# Patient Record
Sex: FEMALE | Race: WHITE | Employment: OTHER | RURAL
[De-identification: names, ages, dates, MRNs, and addresses within clinical notes are randomized per-mention and may not be internally consistent; named-entity substitution may affect disease eponyms.]

---

## 2017-01-01 ENCOUNTER — HOSPITAL ENCOUNTER (OUTPATIENT)
Dept: OTHER | Age: 67
Discharge: HOME | End: 2017-10-18
Attending: NURSE PRACTITIONER | Admitting: NURSE PRACTITIONER

## 2017-01-01 ENCOUNTER — OFFICE VISIT (OUTPATIENT)
Dept: FAMILY MEDICINE CLINIC | Age: 67
End: 2017-01-01
Payer: MEDICARE

## 2017-01-01 VITALS
DIASTOLIC BLOOD PRESSURE: 80 MMHG | HEART RATE: 86 BPM | RESPIRATION RATE: 18 BRPM | BODY MASS INDEX: 32.61 KG/M2 | HEIGHT: 64 IN | OXYGEN SATURATION: 97 % | SYSTOLIC BLOOD PRESSURE: 162 MMHG | WEIGHT: 191 LBS

## 2017-01-01 DIAGNOSIS — R60.9 EDEMA, UNSPECIFIED TYPE: ICD-10-CM

## 2017-01-01 DIAGNOSIS — E78.5 HYPERLIPIDEMIA, UNSPECIFIED HYPERLIPIDEMIA TYPE: ICD-10-CM

## 2017-01-01 DIAGNOSIS — N18.4 KIDNEY DISEASE, CHRONIC, STAGE IV (GFR 15-29 ML/MIN) (HCC): ICD-10-CM

## 2017-01-01 DIAGNOSIS — H54.7 LOSS OF VISION: ICD-10-CM

## 2017-01-01 DIAGNOSIS — R53.83 FATIGUE, UNSPECIFIED TYPE: ICD-10-CM

## 2017-01-01 DIAGNOSIS — J81.0 ACUTE PULMONARY EDEMA (HCC): ICD-10-CM

## 2017-01-01 DIAGNOSIS — L65.9 ALOPECIA: ICD-10-CM

## 2017-01-01 DIAGNOSIS — R05.8 COUGH WITH FROTHY SPUTUM: ICD-10-CM

## 2017-01-01 DIAGNOSIS — R06.02 SHORTNESS OF BREATH: ICD-10-CM

## 2017-01-01 DIAGNOSIS — J81.0 ACUTE PULMONARY EDEMA (HCC): Primary | ICD-10-CM

## 2017-01-01 DIAGNOSIS — I50.9 CONGESTIVE HEART FAILURE, UNSPECIFIED CONGESTIVE HEART FAILURE CHRONICITY, UNSPECIFIED CONGESTIVE HEART FAILURE TYPE: ICD-10-CM

## 2017-01-01 DIAGNOSIS — N18.4 STAGE 4 CHRONIC KIDNEY DISEASE (HCC): ICD-10-CM

## 2017-01-01 DIAGNOSIS — R21 SKIN RASH: ICD-10-CM

## 2017-01-01 DIAGNOSIS — R73.9 HYPERGLYCEMIA: ICD-10-CM

## 2017-01-01 DIAGNOSIS — R09.3 COUGH WITH FROTHY SPUTUM: ICD-10-CM

## 2017-01-01 DIAGNOSIS — Z82.49 FAMILY HISTORY OF CARDIOVASCULAR DISEASE: ICD-10-CM

## 2017-01-01 DIAGNOSIS — R93.89 ABNORMAL CHEST X-RAY: ICD-10-CM

## 2017-01-01 DIAGNOSIS — Z86.79 HISTORY OF CONGESTIVE HEART FAILURE: ICD-10-CM

## 2017-01-01 DIAGNOSIS — I10 ESSENTIAL HYPERTENSION: ICD-10-CM

## 2017-01-01 DIAGNOSIS — Z13.29 THYROID DISORDER SCREENING: ICD-10-CM

## 2017-01-01 DIAGNOSIS — I20.8 STABLE ANGINA (HCC): ICD-10-CM

## 2017-01-01 DIAGNOSIS — B39.9 HISTOPLASMOSIS: ICD-10-CM

## 2017-01-01 DIAGNOSIS — Z87.09 HISTORY OF PULMONARY EDEMA: ICD-10-CM

## 2017-01-01 DIAGNOSIS — Z91.81 HISTORY OF RECENT FALL: ICD-10-CM

## 2017-01-01 DIAGNOSIS — H26.9 CATARACT OF RIGHT EYE, UNSPECIFIED CATARACT TYPE: ICD-10-CM

## 2017-01-01 DIAGNOSIS — R91.8 MASS OF RIGHT LUNG: Primary | ICD-10-CM

## 2017-01-01 LAB
A/G RATIO: 1.4 (ref 0.8–2)
ALBUMIN SERPL-MCNC: 3.9 G/DL (ref 3.4–4.8)
ALP BLD-CCNC: 103 U/L (ref 25–100)
ALT SERPL-CCNC: 6 U/L (ref 4–36)
ANION GAP SERPL CALCULATED.3IONS-SCNC: 16 MMOL/L (ref 3–16)
AST SERPL-CCNC: 13 U/L (ref 8–33)
BASOPHILS ABSOLUTE: 0.1 K/UL (ref 0–0.1)
BASOPHILS RELATIVE PERCENT: 0.4 %
BILIRUB SERPL-MCNC: 0.3 MG/DL (ref 0.3–1.2)
BUN BLDV-MCNC: 29 MG/DL (ref 6–20)
CALCIUM SERPL-MCNC: 8.8 MG/DL (ref 8.5–10.5)
CHLORIDE BLD-SCNC: 108 MMOL/L (ref 98–107)
CHOLESTEROL, TOTAL: 81 MG/DL (ref 0–200)
CO2: 18 MMOL/L (ref 20–30)
CREAT SERPL-MCNC: 2.2 MG/DL (ref 0.4–1.2)
EOSINOPHILS ABSOLUTE: 0.1 K/UL (ref 0–0.4)
EOSINOPHILS RELATIVE PERCENT: 0.8 %
GFR AFRICAN AMERICAN: 27
GFR NON-AFRICAN AMERICAN: 22
GLOBULIN: 2.7 G/DL
GLUCOSE BLD-MCNC: 143 MG/DL (ref 74–106)
HBA1C MFR BLD: 5.2 %
HCT VFR BLD CALC: 28.9 % (ref 37–47)
HDLC SERPL-MCNC: 33 MG/DL (ref 40–60)
HEMOGLOBIN: 8.7 G/DL (ref 11.5–16.5)
LDL CHOLESTEROL CALCULATED: 33 MG/DL
LYMPHOCYTES ABSOLUTE: 0.9 K/UL (ref 1.5–4)
LYMPHOCYTES RELATIVE PERCENT: 7.7 % (ref 20–40)
MCH RBC QN AUTO: 24.1 PG (ref 27–32)
MCHC RBC AUTO-ENTMCNC: 30.1 G/DL (ref 31–35)
MCV RBC AUTO: 80.1 FL (ref 80–100)
MONOCYTES ABSOLUTE: 0.8 K/UL (ref 0.2–0.8)
MONOCYTES RELATIVE PERCENT: 6.7 % (ref 3–10)
NEUTROPHILS ABSOLUTE: 9.6 K/UL (ref 2–7.5)
NEUTROPHILS RELATIVE PERCENT: 84.4 %
PDW BLD-RTO: 15.5 % (ref 11–16)
PLATELET # BLD: 415 K/UL (ref 150–400)
PMV BLD AUTO: 8.8 FL (ref 6–10)
POTASSIUM SERPL-SCNC: 4.7 MMOL/L (ref 3.4–5.1)
RBC # BLD: 3.61 M/UL (ref 3.8–5.8)
SODIUM BLD-SCNC: 142 MMOL/L (ref 136–145)
T4 FREE: 1.45 NG/DL (ref 0.89–1.76)
TOTAL PROTEIN: 6.6 G/DL (ref 6.4–8.3)
TRIGL SERPL-MCNC: 77 MG/DL (ref 0–249)
TSH REFLEX: 2.77 UIU/ML (ref 0.35–5.5)
VITAMIN B-12: 259 PG/ML (ref 211–911)
VLDLC SERPL CALC-MCNC: 15 MG/DL
WBC # BLD: 11.4 K/UL (ref 4–11)

## 2017-01-01 PROCEDURE — G8417 CALC BMI ABV UP PARAM F/U: HCPCS | Performed by: NURSE PRACTITIONER

## 2017-01-01 PROCEDURE — 99204 OFFICE O/P NEW MOD 45 MIN: CPT | Performed by: NURSE PRACTITIONER

## 2017-01-01 PROCEDURE — 1036F TOBACCO NON-USER: CPT | Performed by: NURSE PRACTITIONER

## 2017-01-01 PROCEDURE — 3017F COLORECTAL CA SCREEN DOC REV: CPT | Performed by: NURSE PRACTITIONER

## 2017-01-01 PROCEDURE — G8599 NO ASA/ANTIPLAT THER USE RNG: HCPCS | Performed by: NURSE PRACTITIONER

## 2017-01-01 PROCEDURE — 1123F ACP DISCUSS/DSCN MKR DOCD: CPT | Performed by: NURSE PRACTITIONER

## 2017-01-01 PROCEDURE — 3014F SCREEN MAMMO DOC REV: CPT | Performed by: NURSE PRACTITIONER

## 2017-01-01 PROCEDURE — G8484 FLU IMMUNIZE NO ADMIN: HCPCS | Performed by: NURSE PRACTITIONER

## 2017-01-01 PROCEDURE — 4040F PNEUMOC VAC/ADMIN/RCVD: CPT | Performed by: NURSE PRACTITIONER

## 2017-01-01 PROCEDURE — 1090F PRES/ABSN URINE INCON ASSESS: CPT | Performed by: NURSE PRACTITIONER

## 2017-01-01 PROCEDURE — G8400 PT W/DXA NO RESULTS DOC: HCPCS | Performed by: NURSE PRACTITIONER

## 2017-01-01 PROCEDURE — G8427 DOCREV CUR MEDS BY ELIG CLIN: HCPCS | Performed by: NURSE PRACTITIONER

## 2017-01-01 RX ORDER — DOXAZOSIN 2 MG/1
2 TABLET ORAL DAILY
COMMUNITY
Start: 2017-10-09 | End: 2017-01-01 | Stop reason: SDUPTHER

## 2017-01-01 RX ORDER — NITROGLYCERIN 0.4 MG/1
0.4 TABLET SUBLINGUAL EVERY 5 MIN PRN
COMMUNITY

## 2017-01-01 RX ORDER — LABETALOL 300 MG/1
300 TABLET, FILM COATED ORAL DAILY
Qty: 30 TABLET | Refills: 0
Start: 2017-01-01 | End: 2018-01-01 | Stop reason: SDUPTHER

## 2017-01-01 RX ORDER — TRIAMCINOLONE ACETONIDE 1 MG/G
CREAM TOPICAL
Qty: 80 G | Refills: 3 | Status: SHIPPED | OUTPATIENT
Start: 2017-01-01 | End: 2018-01-01 | Stop reason: ALTCHOICE

## 2017-01-01 RX ORDER — LEVOFLOXACIN 500 MG/1
500 TABLET, FILM COATED ORAL DAILY
Qty: 10 TABLET | Refills: 0 | Status: SHIPPED | OUTPATIENT
Start: 2017-01-01 | End: 2017-01-01

## 2017-01-01 RX ORDER — LABETALOL 300 MG/1
300 TABLET, FILM COATED ORAL 3 TIMES DAILY
COMMUNITY
Start: 2017-09-05 | End: 2017-01-01 | Stop reason: SDUPTHER

## 2017-01-01 RX ORDER — CHLORTHALIDONE 25 MG/1
25 TABLET ORAL DAILY
COMMUNITY
Start: 2017-10-02 | End: 2018-01-01 | Stop reason: ALTCHOICE

## 2017-01-01 RX ORDER — NIFEDIPINE 90 MG/1
90 TABLET, EXTENDED RELEASE ORAL DAILY
Qty: 30 TABLET | Refills: 0
Start: 2017-01-01

## 2017-01-01 RX ORDER — SIMVASTATIN 40 MG
40 TABLET ORAL DAILY
COMMUNITY
Start: 2017-08-18

## 2017-01-01 RX ORDER — LISINOPRIL 20 MG/1
TABLET ORAL
COMMUNITY
Start: 2017-08-22 | End: 2018-01-01 | Stop reason: ALTCHOICE

## 2017-01-01 RX ORDER — DOXAZOSIN 2 MG/1
4 TABLET ORAL DAILY
Qty: 30 TABLET | Refills: 0
Start: 2017-01-01 | End: 2018-01-01 | Stop reason: ALTCHOICE

## 2017-01-01 RX ORDER — NIFEDIPINE 90 MG/1
90 TABLET, EXTENDED RELEASE ORAL 3 TIMES DAILY
COMMUNITY
Start: 2017-09-05 | End: 2017-01-01 | Stop reason: SDUPTHER

## 2017-01-01 ASSESSMENT — ENCOUNTER SYMPTOMS
SHORTNESS OF BREATH: 1
CHEST TIGHTNESS: 1
COUGH: 1
BACK PAIN: 1

## 2017-01-01 ASSESSMENT — PATIENT HEALTH QUESTIONNAIRE - PHQ9
1. LITTLE INTEREST OR PLEASURE IN DOING THINGS: 0
2. FEELING DOWN, DEPRESSED OR HOPELESS: 0
SUM OF ALL RESPONSES TO PHQ9 QUESTIONS 1 & 2: 0
SUM OF ALL RESPONSES TO PHQ QUESTIONS 1-9: 0

## 2017-02-01 RX ORDER — SIMVASTATIN 40 MG
TABLET ORAL
Qty: 90 TABLET | Refills: 1 | Status: SHIPPED | OUTPATIENT
Start: 2017-02-01 | End: 2017-08-18 | Stop reason: SDUPTHER

## 2017-02-09 RX ORDER — DOXAZOSIN 2 MG/1
TABLET ORAL
Qty: 90 TABLET | Refills: 3 | Status: SHIPPED | OUTPATIENT
Start: 2017-02-09 | End: 2017-10-09 | Stop reason: SDUPTHER

## 2017-03-10 RX ORDER — LABETALOL 300 MG/1
TABLET, FILM COATED ORAL
Qty: 90 TABLET | Refills: 3 | Status: SHIPPED | OUTPATIENT
Start: 2017-03-10 | End: 2017-12-18

## 2017-03-23 ENCOUNTER — OFFICE VISIT (OUTPATIENT)
Dept: CARDIOLOGY | Facility: CLINIC | Age: 67
End: 2017-03-23

## 2017-03-23 VITALS
WEIGHT: 188.9 LBS | BODY MASS INDEX: 32.25 KG/M2 | DIASTOLIC BLOOD PRESSURE: 86 MMHG | HEART RATE: 88 BPM | SYSTOLIC BLOOD PRESSURE: 160 MMHG | HEIGHT: 64 IN

## 2017-03-23 DIAGNOSIS — I25.10 CORONARY ARTERY DISEASE INVOLVING NATIVE CORONARY ARTERY OF NATIVE HEART WITHOUT ANGINA PECTORIS: Primary | ICD-10-CM

## 2017-03-23 DIAGNOSIS — I70.1 ATHEROSCLEROTIC RENAL ARTERY STENOSIS, BILATERAL (HCC): ICD-10-CM

## 2017-03-23 DIAGNOSIS — I73.9 PAD (PERIPHERAL ARTERY DISEASE) (HCC): ICD-10-CM

## 2017-03-23 DIAGNOSIS — R09.89 BRUIT OF LEFT CAROTID ARTERY: ICD-10-CM

## 2017-03-23 DIAGNOSIS — I15.0 RENOVASCULAR HYPERTENSION: ICD-10-CM

## 2017-03-23 PROCEDURE — 99214 OFFICE O/P EST MOD 30 MIN: CPT | Performed by: INTERNAL MEDICINE

## 2017-03-23 RX ORDER — CHLORTHALIDONE 25 MG/1
12.5 TABLET ORAL DAILY
Qty: 15 TABLET | Refills: 11 | Status: SHIPPED | OUTPATIENT
Start: 2017-03-23 | End: 2017-10-09

## 2017-03-23 RX ORDER — LISINOPRIL 20 MG/1
20 TABLET ORAL 2 TIMES DAILY
COMMUNITY
End: 2017-05-22 | Stop reason: SDUPTHER

## 2017-03-23 RX ORDER — NITROGLYCERIN 0.4 MG/1
0.4 TABLET SUBLINGUAL
COMMUNITY

## 2017-03-23 NOTE — PROGRESS NOTES
Subjective:     Encounter Date:03/23/2017      Patient ID: Johnathan Chaves is a 66 y.o. female.  PROBLEM LIST:  1. Coronary artery disease with nonobstructive plaque and less than 50% stenoses by catheterization, 06/25/2007.  2. Hypertension:  a. Bilateral renal artery stenoses, status post stenting, 06/25/2007.  b. Well controlled hypertension on medical therapy.  c. Recent renal artery duplex, 03/23/2010, revealing bilateral renal artery stenosis greater than 60% on the right and less than 60% on the left.  3. Peripheral arterial disease:  a. Status post right common iliac stenting, Dr. Leeroy Tony, 06/25/2007.  b. Abnormal ankle brachial indices, 0.64 on the right and 0.92 on the left, 03/23/2010.  4. Dyspepsia.  5. Dyslipidemia (followed by primary care provider).     ALLERGIES/DRUG INTOLERANCES: NKDA.  Chief Complaint: Hypertension  HPI  This is a 66-year-old white female patient with known renal artery stenosis, nonobstructive coronary disease, and peripheral arterial occlusive disease who reports for routine follow-up to evaluate high blood pressure.  The patient is currently taking for blood pressure medications.  Her last to clinic blood pressures have been 160/85.  The patient reports that her home blood pressures tend to run around 130 to 135 mmHg systolic and 80-90 mmHg diastolic.  She has no chest pain or shortness of breath.  She reports having some swelling in her lower extremities particularly on the left lower extremity at the end of the day.  This is worse if she's been on her feet for prolonged periods of time.  She has no orthopnea or PND.  She has no dizziness palpitations or syncope.  She reports stable intermittent claudication.  Ankle-brachial indices 7 years ago demonstrated a right ankle-brachial index of 0.64.  She has previously had stenting of her right common iliac artery.  She has no resting pain or tissue loss.  She is a nondiabetic.  She stopped smoking in 2007.  She has no  history of stroke or TIA.  She has no focal numbness weakness or paralysis, difficulty with gait or balance, difficulty with swallowing or chewing and no difficulty with speech or understanding.  There have been no visual changes.  The remainder of her past medical history, family history and social history remains unchanged compared to her visit one year ago.  The following portions of the patient's history were reviewed and updated as appropriate: allergies, current medications, past family history, past medical history, past social history, past surgical history and problem  Review of Systems   Constitution: Negative for chills, diaphoresis, fever, weakness, malaise/fatigue, night sweats, weight gain and weight loss.   HENT: Negative for ear discharge, hearing loss, hoarse voice and nosebleeds.    Eyes: Negative for discharge, double vision, pain and photophobia.   Cardiovascular: Positive for claudication and leg swelling. Negative for chest pain, cyanosis, dyspnea on exertion, irregular heartbeat, near-syncope, orthopnea, palpitations, paroxysmal nocturnal dyspnea and syncope.   Respiratory: Negative for cough, hemoptysis, shortness of breath, sputum production and wheezing.    Endocrine: Negative for cold intolerance, heat intolerance, polydipsia, polyphagia and polyuria.   Hematologic/Lymphatic: Negative for adenopathy and bleeding problem. Does not bruise/bleed easily.   Skin: Negative for color change, flushing, itching and rash.   Musculoskeletal: Negative for muscle cramps, muscle weakness, myalgias and stiffness.   Gastrointestinal: Negative for abdominal pain, diarrhea, hematemesis, hematochezia, nausea and vomiting.   Genitourinary: Negative for dysuria, frequency and nocturia.   Neurological: Negative for focal weakness, loss of balance, numbness, paresthesias and seizures.   Psychiatric/Behavioral: Negative for altered mental status, hallucinations and suicidal ideas.   Allergic/Immunologic: Negative  for HIV exposure, hives and persistent infections.       Current Outpatient Prescriptions:   •  doxazosin (CARDURA) 2 MG tablet, TAKE ONE TABLET BY MOUTH ONCE NIGHTLY, Disp: 90 tablet, Rfl: 3  •  labetalol (NORMODYNE) 300 MG tablet, TAKE ONE TABLET BY MOUTH ONCE DAILY, Disp: 90 tablet, Rfl: 3  •  lisinopril (PRINIVIL,ZESTRIL) 20 MG tablet, Take 20 mg by mouth 2 (Two) Times a Day., Disp: , Rfl:   •  NIFEdipine XL (PROCARDIA XL) 90 MG 24 hr tablet, TAKE ONE TABLET BY MOUTH ONCE DAILY, Disp: 90 tablet, Rfl: 1  •  nitroglycerin (NITROSTAT) 0.4 MG SL tablet, Place 0.4 mg under the tongue Every 5 (Five) Minutes As Needed for Chest Pain. Take no more than 3 doses in 15 minutes., Disp: , Rfl:   •  simvastatin (ZOCOR) 40 MG tablet, TAKE ONE TABLET BY MOUTH ONCE NIGHTLY, Disp: 90 tablet, Rfl: 1  •  chlorthalidone (HYGROTON) 25 MG tablet, Take 0.5 tablets by mouth Daily., Disp: 15 tablet, Rfl: 11     Objective:     Physical Exam   Constitutional: She is oriented to person, place, and time. She appears well-developed and well-nourished.   HENT:   Head: Normocephalic and atraumatic.   Eyes: Conjunctivae are normal. No scleral icterus.   Cardiovascular: Normal rate, regular rhythm and intact distal pulses.  PMI is not displaced.  Exam reveals gallop and S4. Exam reveals no friction rub.    No murmur heard.  Pulses:       Carotid pulses are 2+ on the right side, and 2+ on the left side with bruit.       Radial pulses are 2+ on the right side, and 2+ on the left side.        Dorsalis pedis pulses are 0 on the right side, and 0 on the left side.        Posterior tibial pulses are 0 on the right side, and 0 on the left side.   Pulmonary/Chest: Effort normal and breath sounds normal. No respiratory distress.   Abdominal: Soft. Bowel sounds are normal. There is no tenderness.   Musculoskeletal: She exhibits no edema.   Neurological: She is alert and oriented to person, place, and time.   Skin: Skin is warm and dry.   Psychiatric: She  "has a normal mood and affect. Her behavior is normal.     Blood pressure 160/86, pulse 88, height 64\" (162.6 cm), weight 188 lb 14.4 oz (85.7 kg).   Lab Review:       Assessment:         1. Coronary artery disease involving native coronary artery of native heart without angina pectoris  Stable and angina free.  She has no exertional chest arm neck jaw shoulder or back discomfort.  There is no evidence of active ischemic heart disease, congestive heart failure or arrhythmia.    2. PAD (peripheral artery disease)  The patient has relatively stable intermittent claudication.  She reports remaining active but does not participate in a formal walking program.  There is no evidence of critical limb ischemia.  - US Ankle / Brachial Indices Extremity Complete    3. Renovascular hypertension  Her blood pressure over the last 2 cardiology clinic appointments has been higher than acceptable.  She reports having systolic blood pressures at home consistently less than 140 mmHg.  She is known to have previous bilateral renal artery PTA and stenting.  In 2010 she had a renal artery duplex Doppler exam which suggested bilateral renal artery stenosis of more than 60% severity.    4. Atherosclerotic renal artery stenosis, bilateral      5. Bruit of left carotid artery  The patient has a loud left-sided carotid bruit.  She indicates that she had a carotid artery evaluation at the [facility] more than 10 years ago does not recall the results.  She currently has no symptoms of stroke or TIA.    - Duplex Carotid Ultrasound CAR    Procedures     Plan:       I have renewed her sublingual nitroglycerin.  The patient has not required any sublingual nitroglycerin use in the last 2 years.  I have added low-dose chlorthalidone 12.5 mg once in the morning to her blood pressure medications.  If her blood pressure remains consistently elevated I would increase the frequency of her labetalol.  She is currently taking 300 " mg once per day.  She would be better served taking a lower dose such as one to 200 mg 2-3 times per day.  She has been counseled regarding the importance of dietary sodium restriction.  She has been congratulated on her smoking cessation and cautioned to never again resumed cigarette smoking.  The patient is not yet in a clinical scenario that would warrant repeat renal artery revascularization.  I have recommended repeat ankle-brachial indices.  I have also recommended bilateral carotid artery ultrasound with duplex Doppler.  Further recommendations will be predicated on the outcome of this testing.

## 2017-03-28 ENCOUNTER — HOSPITAL ENCOUNTER (OUTPATIENT)
Dept: GENERAL RADIOLOGY | Age: 67
Discharge: HOME | End: 2017-03-28
Attending: INTERNAL MEDICINE | Admitting: INTERNAL MEDICINE

## 2017-03-28 DIAGNOSIS — I73.9 PAD (PERIPHERAL ARTERY DISEASE) (HCC): ICD-10-CM

## 2017-03-28 DIAGNOSIS — I65.01 OCCLUSION AND STENOSIS OF RIGHT VERTEBRAL ARTERY: ICD-10-CM

## 2017-04-27 ENCOUNTER — OFFICE VISIT (OUTPATIENT)
Dept: CARDIOLOGY | Facility: CLINIC | Age: 67
End: 2017-04-27

## 2017-04-27 VITALS
BODY MASS INDEX: 32.96 KG/M2 | WEIGHT: 186 LBS | SYSTOLIC BLOOD PRESSURE: 160 MMHG | DIASTOLIC BLOOD PRESSURE: 70 MMHG | HEIGHT: 63 IN | HEART RATE: 84 BPM

## 2017-04-27 DIAGNOSIS — I73.9 PAD (PERIPHERAL ARTERY DISEASE) (HCC): ICD-10-CM

## 2017-04-27 DIAGNOSIS — R09.89 BRUIT OF LEFT CAROTID ARTERY: ICD-10-CM

## 2017-04-27 DIAGNOSIS — I15.0 RENOVASCULAR HYPERTENSION: ICD-10-CM

## 2017-04-27 DIAGNOSIS — I25.10 CORONARY ARTERY DISEASE INVOLVING NATIVE CORONARY ARTERY OF NATIVE HEART WITHOUT ANGINA PECTORIS: Primary | ICD-10-CM

## 2017-04-27 PROCEDURE — 99213 OFFICE O/P EST LOW 20 MIN: CPT | Performed by: INTERNAL MEDICINE

## 2017-04-27 NOTE — PROGRESS NOTES
Subjective:     Encounter Date:04/27/2017      Patient ID: Johnathan Chaves is a 66 y.o. female.    Chief Complaint:Hypertension  HPI  This is a 66-year-old female patient who has known renovascular hypertension and has had previous bilateral renal artery PTA and stenting in June 2007 who was evaluated at last visit for PAD.  The patient underwent resting ankle brachial indices which demonstrated an abnormal value on the right of 0.4 and an abnormal value on the left of 0.8.  The patient indicates that she has no claudication.  She indicates that she walks substantial distances daily and has no leg pain weakness or other atypical lower extremity symptoms.  She has had no resting pain or ulcerations.  She is no longer a smoker.  The patient was recently started on diuretic therapy and has lost 10 pounds of weight.  She indicates that her shortness of breath has improved dramatically.  The patient indicates that she can she walked from the parking lot to to the clinic including coming up the hill to the emergency room she had no shortness of breath, chest discomfort, or leg pain\weakness.  She has no orthopnea PND or lower extremity edema.  There is no dizziness palpitations or syncope.  The patient also underwent carotid artery ultrasound with duplex Doppler which showed no evidence of obstructive disease.  She has no symptoms of stroke or TIA.  The remainder of her past medical history, family history and social history remains unchanged compared to her last visit.  The following portions of the patient's history were reviewed and updated as appropriate: allergies, current medications, past family history, past medical history, past social history, past surgical history and problem  Review of Systems   Constitution: Negative for chills, diaphoresis, fever, weakness, malaise/fatigue, night sweats, weight gain and weight loss.   HENT: Negative for ear discharge, hearing loss, hoarse voice and nosebleeds.    Eyes:  Negative for discharge, double vision, pain and photophobia.   Cardiovascular: Negative for chest pain, claudication, cyanosis, dyspnea on exertion, irregular heartbeat, leg swelling, near-syncope, orthopnea, palpitations, paroxysmal nocturnal dyspnea and syncope.   Respiratory: Negative for cough, hemoptysis, shortness of breath, sputum production and wheezing.    Endocrine: Negative for cold intolerance, heat intolerance, polydipsia, polyphagia and polyuria.   Hematologic/Lymphatic: Negative for adenopathy and bleeding problem. Does not bruise/bleed easily.   Skin: Negative for color change, flushing, itching and rash.   Musculoskeletal: Negative for muscle cramps, muscle weakness, myalgias and stiffness.   Gastrointestinal: Negative for abdominal pain, diarrhea, hematemesis, hematochezia, nausea and vomiting.   Genitourinary: Negative for dysuria, frequency and nocturia.   Neurological: Negative for focal weakness, loss of balance, numbness, paresthesias and seizures.   Psychiatric/Behavioral: Negative for altered mental status, hallucinations and suicidal ideas.   Allergic/Immunologic: Negative for HIV exposure, hives and persistent infections.         Current Outpatient Prescriptions:   •  chlorthalidone (HYGROTON) 25 MG tablet, Take 0.5 tablets by mouth Daily., Disp: 15 tablet, Rfl: 11  •  doxazosin (CARDURA) 2 MG tablet, TAKE ONE TABLET BY MOUTH ONCE NIGHTLY, Disp: 90 tablet, Rfl: 3  •  labetalol (NORMODYNE) 300 MG tablet, TAKE ONE TABLET BY MOUTH ONCE DAILY, Disp: 90 tablet, Rfl: 3  •  lisinopril (PRINIVIL,ZESTRIL) 20 MG tablet, Take 20 mg by mouth 2 (Two) Times a Day., Disp: , Rfl:   •  NIFEdipine XL (PROCARDIA XL) 90 MG 24 hr tablet, TAKE ONE TABLET BY MOUTH ONCE DAILY, Disp: 90 tablet, Rfl: 1  •  nitroglycerin (NITROSTAT) 0.4 MG SL tablet, Place 0.4 mg under the tongue Every 5 (Five) Minutes As Needed for Chest Pain. Take no more than 3 doses in 15 minutes., Disp: , Rfl:   •  simvastatin (ZOCOR) 40 MG  "tablet, TAKE ONE TABLET BY MOUTH ONCE NIGHTLY, Disp: 90 tablet, Rfl: 1     Objective:     Physical Exam   Constitutional: She is oriented to person, place, and time. She appears well-developed and well-nourished.   HENT:   Head: Normocephalic and atraumatic.   Eyes: Conjunctivae are normal. No scleral icterus.   Cardiovascular: Normal rate, regular rhythm, normal heart sounds and intact distal pulses.  Exam reveals no gallop and no friction rub.    No murmur heard.  Pulmonary/Chest: Effort normal and breath sounds normal. No respiratory distress.   Abdominal: Soft. Bowel sounds are normal. There is no tenderness.   Musculoskeletal: She exhibits no edema.   Neurological: She is alert and oriented to person, place, and time.   Skin: Skin is warm and dry.   Psychiatric: She has a normal mood and affect. Her behavior is normal.     Blood pressure 160/70, pulse 84, height 63\" (160 cm), weight 186 lb (84.4 kg).   Lab Review:       Assessment:         1. Coronary artery disease involving native coronary artery of native heart without angina pectoris  Stable and angina free with an active lifestyle.    2. Renovascular hypertension  The patient's blood pressure remains elevated but she does not have a clinical scenario that is indicative of need for renal artery revascularization.    3. PAD (peripheral artery disease)  Despite having severe reduction in resting ankle brachial indices on the right and to a lesser degree on the left the patient remains completely asymptomatic with no intermittent claudication or other atypical leg symptoms and no evidence of critical limb ischemia.  At this point revascularization is not indicated and there would be no indication for further imaging studies in the absence of planned revascularization.    4. Bruit of left carotid artery  Bilateral carotid ultrasound showed no evidence of significant stenosis.  She has no symptoms to suggest stroke or TIA.  Procedures     Plan:       We " continued emphasize the importance of secondary risk factor modification.  Her blood pressure medications will continue to be uptitrated in order to keep her systolic blood pressure less than 150.  If her blood pressure remains elevated I would advocate increasing her Cardura to 4 mg per day.  She may also benefit from initiation of an angiotensin II receptor blocker.  She is encouraged to continue maintaining her active lifestyle with emphasis on regular daily walking.  Fortunately she no longer smokes.  She will follow-up in our office in 6 months.  No additional cardiac testing is indicated.

## 2017-05-22 RX ORDER — LISINOPRIL 20 MG/1
TABLET ORAL
Qty: 180 TABLET | Refills: 0 | Status: SHIPPED | OUTPATIENT
Start: 2017-05-22 | End: 2017-08-22 | Stop reason: SDUPTHER

## 2017-06-05 RX ORDER — NIFEDIPINE 90 MG/1
TABLET, EXTENDED RELEASE ORAL
Qty: 90 TABLET | Refills: 1 | Status: SHIPPED | OUTPATIENT
Start: 2017-06-05

## 2017-06-09 ENCOUNTER — TELEPHONE (OUTPATIENT)
Dept: CARDIOLOGY | Facility: CLINIC | Age: 67
End: 2017-06-09

## 2017-06-09 NOTE — TELEPHONE ENCOUNTER
Patient called and stated both legs swelling. The left leg a little more than the rt. Pt stated she wanted to go ahead and have stents put in her legs as discussed with Dr. Tony. Told her that according to his note as long as she remains asymptomatic with no pain or other atypical leg symptoms and no evidence of color changes in her leg there was no indication for a procedure. Instructed her to call back if any changes in her legs or if swelling not relieved by elevating her legs. Verbalized understanding.

## 2017-08-18 RX ORDER — SIMVASTATIN 40 MG
TABLET ORAL
Qty: 90 TABLET | Refills: 0 | Status: SHIPPED | OUTPATIENT
Start: 2017-08-18

## 2017-08-22 RX ORDER — LISINOPRIL 20 MG/1
TABLET ORAL
Qty: 180 TABLET | Refills: 3 | Status: SHIPPED | OUTPATIENT
Start: 2017-08-22 | End: 2017-12-18

## 2017-10-03 ENCOUNTER — TELEPHONE (OUTPATIENT)
Dept: CARDIOLOGY | Facility: CLINIC | Age: 67
End: 2017-10-03

## 2017-10-03 NOTE — TELEPHONE ENCOUNTER
Patient called and stated has increased swelling in her legs,hands and face. Soa on exertion and coughing with yellow sputum produced. This all started 2 days ago. Has not weighed herself or checked her b/p in the past 2 days. No changes in her medication from office visit 4/27/2017. Appt made at [facility] center tomorrow at 10:30 [address]. Patient verbalized understanding.

## 2017-10-04 ENCOUNTER — OFFICE VISIT (OUTPATIENT)
Dept: CARDIOLOGY | Facility: HOSPITAL | Age: 67
End: 2017-10-04

## 2017-10-04 ENCOUNTER — APPOINTMENT (OUTPATIENT)
Dept: LAB | Facility: HOSPITAL | Age: 67
End: 2017-10-04

## 2017-10-04 VITALS
RESPIRATION RATE: 22 BRPM | DIASTOLIC BLOOD PRESSURE: 76 MMHG | HEIGHT: 63 IN | OXYGEN SATURATION: 97 % | SYSTOLIC BLOOD PRESSURE: 181 MMHG | HEART RATE: 83 BPM | BODY MASS INDEX: 34.48 KG/M2 | TEMPERATURE: 98.4 F | WEIGHT: 194.6 LBS

## 2017-10-04 DIAGNOSIS — I15.0 RENOVASCULAR HYPERTENSION: Primary | ICD-10-CM

## 2017-10-04 DIAGNOSIS — R06.00 DYSPNEA, UNSPECIFIED TYPE: ICD-10-CM

## 2017-10-04 DIAGNOSIS — R60.9 EDEMA, UNSPECIFIED TYPE: ICD-10-CM

## 2017-10-04 DIAGNOSIS — I25.10 CORONARY ARTERY DISEASE INVOLVING NATIVE CORONARY ARTERY OF NATIVE HEART WITHOUT ANGINA PECTORIS: ICD-10-CM

## 2017-10-04 LAB
ANION GAP SERPL CALCULATED.3IONS-SCNC: 9 MMOL/L (ref 3–11)
BNP SERPL-MCNC: 120 PG/ML (ref 0–100)
BUN BLD-MCNC: 36 MG/DL (ref 9–23)
BUN/CREAT SERPL: 16.4 (ref 7–25)
CALCIUM SPEC-SCNC: 8.4 MG/DL (ref 8.7–10.4)
CHLORIDE SERPL-SCNC: 112 MMOL/L (ref 99–109)
CO2 SERPL-SCNC: 19 MMOL/L (ref 20–31)
CREAT BLD-MCNC: 2.2 MG/DL (ref 0.6–1.3)
DEPRECATED RDW RBC AUTO: 45.5 FL (ref 37–54)
ERYTHROCYTE [DISTWIDTH] IN BLOOD BY AUTOMATED COUNT: 15.4 % (ref 11.3–14.5)
GFR SERPL CREATININE-BSD FRML MDRD: 22 ML/MIN/1.73
GLUCOSE BLD-MCNC: 111 MG/DL (ref 70–100)
HCT VFR BLD AUTO: 28.2 % (ref 34.5–44)
HGB BLD-MCNC: 9 G/DL (ref 11.5–15.5)
MCH RBC QN AUTO: 25.6 PG (ref 27–31)
MCHC RBC AUTO-ENTMCNC: 31.9 G/DL (ref 32–36)
MCV RBC AUTO: 80.1 FL (ref 80–99)
PLATELET # BLD AUTO: 310 10*3/MM3 (ref 150–450)
PMV BLD AUTO: 7.8 FL (ref 6–12)
POTASSIUM BLD-SCNC: 4.5 MMOL/L (ref 3.5–5.5)
RBC # BLD AUTO: 3.52 10*6/MM3 (ref 3.89–5.14)
SODIUM BLD-SCNC: 140 MMOL/L (ref 132–146)
T4 FREE SERPL-MCNC: 1.01 NG/DL (ref 0.89–1.76)
TSH SERPL DL<=0.05 MIU/L-ACNC: 4.31 MIU/ML (ref 0.35–5.35)
WBC NRBC COR # BLD: 12.83 10*3/MM3 (ref 3.5–10.8)

## 2017-10-04 PROCEDURE — 83880 ASSAY OF NATRIURETIC PEPTIDE: CPT | Performed by: NURSE PRACTITIONER

## 2017-10-04 PROCEDURE — 85027 COMPLETE CBC AUTOMATED: CPT | Performed by: NURSE PRACTITIONER

## 2017-10-04 PROCEDURE — 80048 BASIC METABOLIC PNL TOTAL CA: CPT | Performed by: NURSE PRACTITIONER

## 2017-10-04 PROCEDURE — 36415 COLL VENOUS BLD VENIPUNCTURE: CPT | Performed by: NURSE PRACTITIONER

## 2017-10-04 PROCEDURE — 99214 OFFICE O/P EST MOD 30 MIN: CPT | Performed by: NURSE PRACTITIONER

## 2017-10-04 PROCEDURE — 84439 ASSAY OF FREE THYROXINE: CPT | Performed by: NURSE PRACTITIONER

## 2017-10-04 PROCEDURE — 84443 ASSAY THYROID STIM HORMONE: CPT | Performed by: NURSE PRACTITIONER

## 2017-10-04 NOTE — PATIENT INSTRUCTIONS
Please keep blood pressure log twice a day.      Call if top number of blood pressure is greater than 140 consistently.    Increase chlorthalidone 25 mg 1 tablet daily X1 day.  Will call to discuss lab results and plan.      You will be called to schedule the echocardiogram

## 2017-10-04 NOTE — PROGRESS NOTES
[facility]  [facility]      Encounter Date:10/04/2017     Johnathan Chaves  [address] [facility] [address]  [phone]    1950    MALIK Wall    Johnathan Chaves is a 67 y.o. female.      Subjective:     Chief Complaint:  Edema (and SOA)       HPI   Pt resents today with worsening SOB for 1 week.  Associated wheezing, increased edema (hands, face, and lower extremities), worsening cough.  S/s actually improved with rest and lying down.  She denies CP, pressure, palpitations.  States her dyspnea is better today.  C/o of itching of lower leg with increased swelling.  Pt reports she had been on chlorthalidone since may.  She reports after starting meds she had a 10 lb weight loss.  She states her weight at home has been stable.  This is a 66-year-old female patient who has known renovascular hypertension and has had previous bilateral renal artery PTA and stenting in June 2007.  She was recently evaluated by Dr. Tony for PAD.  The patient underwent resting ankle brachial indices which demonstrated an abnormal value on the right of 0.4 and an abnormal value on the left of 0.8. She indicates that she walks substantial distances daily and has no leg pain weakness or other atypical lower extremity symptoms.  She has had no resting pain or ulcerations.  She is no longer a smoker.   There is no dizziness palpitations or syncope.  The patient also underwent carotid artery ultrasound with duplex Doppler which showed no evidence of obstructive disease.  She has no symptoms of stroke or TIA.         Patient Active Problem List   Diagnosis   • CAD (coronary artery disease)   • Hypertension   • PAD (peripheral artery disease)   • Dyslipidemia   • Atherosclerotic renal artery stenosis, bilateral   • Bruit of left carotid artery         Past Surgical History:   Procedure Laterality Date   • CORONARY ANGIOPLASTY WITH STENT PLACEMENT  2004   • EXTERNAL EAR SURGERY      as a child        No Known Allergies      Current Outpatient Prescriptions:   •  chlorthalidone (HYGROTON) 25 MG tablet, Take 0.5 tablets by mouth Daily., Disp: 15 tablet, Rfl: 11  •  doxazosin (CARDURA) 2 MG tablet, TAKE ONE TABLET BY MOUTH ONCE NIGHTLY, Disp: 90 tablet, Rfl: 3  •  labetalol (NORMODYNE) 300 MG tablet, TAKE ONE TABLET BY MOUTH ONCE DAILY, Disp: 90 tablet, Rfl: 3  •  lisinopril (PRINIVIL,ZESTRIL) 20 MG tablet, TAKE ONE TABLET BY MOUTH TWICE A DAY *MUST MAKE APPT FOR FURTHER REFILLS TO HAVE LABS TO CHECK KIDNEY FUNCTION, Disp: 180 tablet, Rfl: 3  •  NIFEdipine XL (PROCARDIA XL) 90 MG 24 hr tablet, TAKE ONE TABLET BY MOUTH ONCE DAILY, Disp: 90 tablet, Rfl: 1  •  nitroglycerin (NITROSTAT) 0.4 MG SL tablet, Place 0.4 mg under the tongue Every 5 (Five) Minutes As Needed for Chest Pain. Take no more than 3 doses in 15 minutes., Disp: , Rfl:   •  simvastatin (ZOCOR) 40 MG tablet, TAKE ONE TABLET BY MOUTH ONCE NIGHTLY, Disp: 90 tablet, Rfl: 0    The following portions of the patient's history were reviewed and updated as appropriate: allergies, current medications, past family history, past medical history, past social history, past surgical history and problem list.    Review of Systems   Constitution: Negative for chills, decreased appetite, diaphoresis, fever, weakness, malaise/fatigue, night sweats, weight gain and weight loss.   HENT: Positive for congestion. Negative for nosebleeds.    Eyes: Negative for blurred vision, visual disturbance and visual halos.   Cardiovascular: Positive for dyspnea on exertion and leg swelling. Negative for chest pain, claudication, cyanosis, irregular heartbeat, near-syncope, orthopnea, palpitations, paroxysmal nocturnal dyspnea and syncope.   Respiratory: Positive for cough, shortness of breath and sputum production. Negative for hemoptysis, sleep disturbances due to breathing, snoring and wheezing.    Endocrine: Negative for cold intolerance, heat intolerance, polydipsia,  "polyphagia and polyuria.   Hematologic/Lymphatic: Does not bruise/bleed easily.   Skin: Negative for dry skin, itching and rash.   Musculoskeletal: Negative for falls, joint pain, joint swelling, muscle weakness and myalgias.   Gastrointestinal: Negative for bloating, abdominal pain, constipation, diarrhea, dysphagia, heartburn, melena, nausea and vomiting.   Genitourinary: Positive for nocturia. Negative for dysuria, flank pain and hematuria.   Neurological: Negative for difficulty with concentration, excessive daytime sleepiness, dizziness, headaches and loss of balance.   Psychiatric/Behavioral: Negative for altered mental status and depression. The patient is not nervous/anxious.    Allergic/Immunologic: Negative for environmental allergies.       Objective:     Vitals:    10/04/17 1020 10/04/17 1024 10/04/17 1027   BP: (!) 184/88 177/81 (!) 181/76   BP Location: Right arm Left arm Left arm   Patient Position: Sitting Sitting Standing   Pulse: 81 80 83   Resp: 22     Temp: 98.4 °F (36.9 °C)     TempSrc: Temporal Artery      SpO2: 97%     Weight: 194 lb 9.6 oz (88.3 kg)     Height: 63\" (160 cm)           Physical Exam   Constitutional: She is oriented to person, place, and time. She appears well-developed and well-nourished. No distress.   HENT:   Head: Normocephalic and atraumatic.   Mouth/Throat: Oropharynx is clear and moist.   Eyes: Conjunctivae are normal. Pupils are equal, round, and reactive to light. No scleral icterus.   Neck: No hepatojugular reflux and no JVD present. Carotid bruit is not present. No tracheal deviation present. No thyromegaly present.   Cardiovascular: Normal rate, regular rhythm, normal heart sounds and intact distal pulses.  Exam reveals no friction rub.    No murmur heard.  Pulmonary/Chest: Effort normal and breath sounds normal.   Abdominal: Soft. Bowel sounds are normal. She exhibits no distension. There is no tenderness.   Musculoskeletal: She exhibits no edema. "   Lymphadenopathy:     She has no cervical adenopathy.   Neurological: She is alert and oriented to person, place, and time.   Skin: Skin is warm, dry and intact. No rash noted. No cyanosis or erythema. No pallor.   Psychiatric: She has a normal mood and affect. Her behavior is normal. Thought content normal.   Vitals reviewed.      Lab and Diagnostic Review:  No recent labs.  Will request for review  Assessment and Plan:         1. Hypertension, renovascular    Elevated today.    Currently on Cardura, labetalol, lisinopril, procardia XL    Possibly increase Cardura    Keep Blood pressure log twice a day.  Bring to f/u visit.    ?referral to nephrology.  2. Dyspnea, unspecified type    - CBC (No Diff)  - BNP  - Adult Transthoracic Echo Complete W/ Cont if Necessary Per Protocol; Future  - TSH  - T4, Free    3. Edema, unspecified type  Pt to increase Chlorthalidone 25 mg daily X 1 day.  Will discuss medications pending lab results.    Pt is scheduled f/u with Dr. Chowdhury 1 month.        *Please note that portions of this note were completed with a voice recognition program. Efforts were made to edit the dictations, but occasionally words are mistranscribed.

## 2017-10-06 DIAGNOSIS — N18.9 ACUTE RENAL FAILURE SUPERIMPOSED ON CHRONIC KIDNEY DISEASE, UNSPECIFIED CKD STAGE, UNSPECIFIED ACUTE RENAL FAILURE TYPE (HCC): ICD-10-CM

## 2017-10-06 DIAGNOSIS — N17.9 ACUTE RENAL FAILURE SUPERIMPOSED ON CHRONIC KIDNEY DISEASE, UNSPECIFIED CKD STAGE, UNSPECIFIED ACUTE RENAL FAILURE TYPE (HCC): ICD-10-CM

## 2017-10-06 DIAGNOSIS — I15.0 RENOVASCULAR HYPERTENSION: Primary | ICD-10-CM

## 2017-10-06 NOTE — PROGRESS NOTES
Results for orders placed or performed in visit on 10/04/17   Basic Metabolic Panel   Result Value Ref Range    Glucose 111 (H) 70 - 100 mg/dL    BUN 36 (H) 9 - 23 mg/dL    Creatinine 2.20 (H) 0.60 - 1.30 mg/dL    Sodium 140 132 - 146 mmol/L    Potassium 4.5 3.5 - 5.5 mmol/L    Chloride 112 (H) 99 - 109 mmol/L    CO2 19.0 (L) 20.0 - 31.0 mmol/L    Calcium 8.4 (L) 8.7 - 10.4 mg/dL    eGFR Non African Amer 22 (L) >60 mL/min/1.73    BUN/Creatinine Ratio 16.4 7.0 - 25.0    Anion Gap 9.0 3.0 - 11.0 mmol/L   CBC (No Diff)   Result Value Ref Range    WBC 12.83 (H) 3.50 - 10.80 10*3/mm3    RBC 3.52 (L) 3.89 - 5.14 10*6/mm3    Hemoglobin 9.0 (L) 11.5 - 15.5 g/dL    Hematocrit 28.2 (L) 34.5 - 44.0 %    MCV 80.1 80.0 - 99.0 fL    MCH 25.6 (L) 27.0 - 31.0 pg    MCHC 31.9 (L) 32.0 - 36.0 g/dL    RDW 15.4 (H) 11.3 - 14.5 %    RDW-SD 45.5 37.0 - 54.0 fl    MPV 7.8 6.0 - 12.0 fL    Platelets 310 150 - 450 10*3/mm3   BNP   Result Value Ref Range    .0 (H) 0.0 - 100.0 pg/mL   TSH   Result Value Ref Range    TSH 4.309 0.350 - 5.350 mIU/mL   T4, Free   Result Value Ref Range    Free T4 1.01 0.89 - 1.76 ng/dL     Creatinine 2.2.  NO recent labs.  Pt states she has not seen her PCP in over a year and PCP did not have any recent labs.    Pt will hold Chlorthalidone at this time.  Repeat BMP on Monday, 10/9/17 [facility]..    Patient will monitor weight, signs and symptoms of heart failure worsening.  She'll keep her blood pressure twice a day.    Patient states she has never seen a nephrologist.  With history of renovascular hypertension we'll refer to nephrology.  She has requested Dr. Arpan Summers in [place].  Will completed referral.

## 2017-10-09 ENCOUNTER — TELEPHONE (OUTPATIENT)
Dept: CARDIOLOGY | Facility: HOSPITAL | Age: 67
End: 2017-10-09

## 2017-10-09 ENCOUNTER — HOSPITAL ENCOUNTER (OUTPATIENT)
Dept: OTHER | Age: 67
Discharge: HOME | End: 2017-10-09

## 2017-10-09 DIAGNOSIS — N18.30 STAGE 3 CHRONIC KIDNEY DISEASE (HCC): ICD-10-CM

## 2017-10-09 DIAGNOSIS — I10 HYPERTENSION, UNSPECIFIED TYPE: Primary | ICD-10-CM

## 2017-10-09 LAB
ANION GAP SERPL CALCULATED.3IONS-SCNC: 15 MMOL/L (ref 3–16)
BUN BLDV-MCNC: 29 MG/DL (ref 6–20)
CALCIUM SERPL-MCNC: 8.7 MG/DL (ref 8.5–10.5)
CHLORIDE BLD-SCNC: 107 MMOL/L (ref 98–107)
CO2: 18 MMOL/L (ref 20–30)
CREAT SERPL-MCNC: 2.2 MG/DL (ref 0.4–1.2)
GFR AFRICAN AMERICAN: 27
GFR NON-AFRICAN AMERICAN: 22
GLUCOSE BLD-MCNC: 115 MG/DL (ref 74–106)
POTASSIUM SERPL-SCNC: 4.6 MMOL/L (ref 3.4–5.1)
SODIUM BLD-SCNC: 140 MMOL/L (ref 136–145)

## 2017-10-09 RX ORDER — DOXAZOSIN 2 MG/1
4 TABLET ORAL NIGHTLY
Qty: 30 TABLET | Refills: 1 | Status: SHIPPED | OUTPATIENT
Start: 2017-10-09

## 2017-10-09 NOTE — TELEPHONE ENCOUNTER
Receive messages below.  Received lab results as requested 10/9/17:    Sodium 140, potassium 4.6, chloride 107, carbon dioxide 18, glucose 1:15, BUN 29, creatinine 2.2 (baseline unknown), calcium 8.7, estimated GFR 22    Pt will increase Cardura 4 mg daily.  Continue to hold chlorthalidone.  Keep home blood pressure and heart rate log .  Repeat BMP 2 weeks.      Follow-up with nephrology 1 scheduled from previous referral.  Follow-up with Dr. Chowdhury as scheduled   ----- Message from Henna Osorio MA sent at 10/9/2017  4:17 PM EDT -----  Reports she is not as swollen, legs hardly no edema, right hand some noticed, left hand is ok.  SOB not as bad as it had been.  Notices she starts coughing after taking lisnopril.   She has not weighed herself.        ----- Message -----     From: MALIK Abraham     Sent: 10/9/2017  11:59 AM       To: Henna Osorio MA    Did she have worsening SOB, edema, or weight gain?  ----- Message -----     From: Henna Osorio MA     Sent: 10/9/2017  11:42 AM       To: MALIK Abraham    Labs received from [facility] completed 10/9/17.  Contacted pt to review BP, HR log:   10/6/17 -  8:00pm - 166/75, 89  10/7/17 - 8:00am - 181/86, 94  10/7/17 - 9:00pm - 157/78, 93  10/8/17 - 9:30am - 170/82, 94  10/8/17 - 6:00pm - 173/80, 87  10/9/17 -8:30am - 188/83, 94    ----- Message -----     From: MALIK Abraham     Sent: 10/6/2017   8:39 AM       To: MALIK Chauhan, Henna Osorio MA    I have spoken with pt.  She is going to hold her chlorthalidone over the weekend.  She is going to keep a home blood pressure log twice a day.  She'll have repeat BMP at [facility] next Monday.  We'll you please fax in order.  And follow-up on lab work and blood pressure log, and signs and symptoms of worsening edema, shortness of breath, or weight gain next week while I am gone.  A referral to nephrology has been completed.  Ava review once labs and blood pressure  log are obtained.    ----- Message -----     From: Henna Osorio MA     Sent: 10/5/2017   3:03 PM       To: MALIK Abraham    Contacted PCP's office. They report they have not seen her.  Contacted pt.  Pt says its been over a year since she last had labwork done and doesn't remember what or where it was done.   She will get labs done at [place], [place] if needed.        ----- Message -----     From: MALIK Abraham     Sent: 10/4/2017   3:28 PM       To: Henna Osorio MA    Call and request last labs [facility]

## 2017-10-10 ENCOUNTER — TELEPHONE (OUTPATIENT)
Dept: CARDIOLOGY | Facility: HOSPITAL | Age: 67
End: 2017-10-10

## 2017-10-16 DIAGNOSIS — R06.02 SHORTNESS OF BREATH: Primary | ICD-10-CM

## 2017-10-16 DIAGNOSIS — R06.2 WHEEZING: ICD-10-CM

## 2017-10-16 RX ORDER — ALBUTEROL SULFATE 90 UG/1
2 AEROSOL, METERED RESPIRATORY (INHALATION) EVERY 6 HOURS PRN
Qty: 1 INHALER | Refills: 2 | Status: SHIPPED | OUTPATIENT
Start: 2017-10-16

## 2017-10-17 ENCOUNTER — HOSPITAL ENCOUNTER (OUTPATIENT)
Dept: CARDIOLOGY | Facility: HOSPITAL | Age: 67
Discharge: HOME | End: 2017-10-17
Admitting: NURSE PRACTITIONER

## 2017-10-17 VITALS — HEIGHT: 63 IN | WEIGHT: 194 LBS | BODY MASS INDEX: 34.38 KG/M2

## 2017-10-17 DIAGNOSIS — R06.00 DYSPNEA, UNSPECIFIED TYPE: ICD-10-CM

## 2017-10-17 DIAGNOSIS — R60.9 EDEMA, UNSPECIFIED TYPE: ICD-10-CM

## 2017-10-17 DIAGNOSIS — I15.0 RENOVASCULAR HYPERTENSION: ICD-10-CM

## 2017-10-17 LAB
AORTIC DIMENSIONLESS INDEX: 0.5 (DI)
BH CV ECHO MEAS - AO MAX PG (FULL): 14 MMHG
BH CV ECHO MEAS - AO MAX PG: 20 MMHG
BH CV ECHO MEAS - AO MEAN PG (FULL): 7 MMHG
BH CV ECHO MEAS - AO MEAN PG: 10 MMHG
BH CV ECHO MEAS - AO ROOT AREA (BSA CORRECTED): 1.8
BH CV ECHO MEAS - AO ROOT AREA: 9.6 CM^2
BH CV ECHO MEAS - AO ROOT DIAM: 3.5 CM
BH CV ECHO MEAS - AO V2 MAX: 222 CM/SEC
BH CV ECHO MEAS - AO V2 MEAN: 139.5 CM/SEC
BH CV ECHO MEAS - AO V2 VTI: 47 CM
BH CV ECHO MEAS - AVA(I,A): 1.8 CM^2
BH CV ECHO MEAS - AVA(I,D): 1.6 CM^2
BH CV ECHO MEAS - AVA(V,A): 1.6 CM^2
BH CV ECHO MEAS - AVA(V,D): 1.6 CM^2
BH CV ECHO MEAS - BSA(HAYCOCK): 2 M^2
BH CV ECHO MEAS - BSA: 1.9 M^2
BH CV ECHO MEAS - BZI_BMI: 34.4 KILOGRAMS/M^2
BH CV ECHO MEAS - BZI_METRIC_HEIGHT: 160 CM
BH CV ECHO MEAS - BZI_METRIC_WEIGHT: 88 KG
BH CV ECHO MEAS - CONTRAST EF (2CH): 46.9 ML/M^2
BH CV ECHO MEAS - CONTRAST EF 4CH: 50.7 ML/M^2
BH CV ECHO MEAS - EDV(CUBED): 256 ML
BH CV ECHO MEAS - EDV(MOD-SP2): 226 ML
BH CV ECHO MEAS - EDV(MOD-SP4): 221 ML
BH CV ECHO MEAS - EDV(TEICH): 204.8 ML
BH CV ECHO MEAS - EF(CUBED): 75.9 %
BH CV ECHO MEAS - EF(MOD-SP2): 46.9 %
BH CV ECHO MEAS - EF(MOD-SP4): 52 %
BH CV ECHO MEAS - EF(TEICH): 66.8 %
BH CV ECHO MEAS - ESV(CUBED): 61.6 ML
BH CV ECHO MEAS - ESV(MOD-SP2): 120 ML
BH CV ECHO MEAS - ESV(MOD-SP4): 109 ML
BH CV ECHO MEAS - ESV(TEICH): 67.9 ML
BH CV ECHO MEAS - FS: 37.8 %
BH CV ECHO MEAS - IVS/LVPW: 0.77
BH CV ECHO MEAS - IVSD: 0.93 CM
BH CV ECHO MEAS - LA DIMENSION: 4.7 CM
BH CV ECHO MEAS - LA/AO: 1.3
BH CV ECHO MEAS - LV DIASTOLIC VOL/BSA (35-75): 115.8 ML/M^2
BH CV ECHO MEAS - LV IVRT: 0.09 SEC
BH CV ECHO MEAS - LV MASS(C)D: 299.2 GRAMS
BH CV ECHO MEAS - LV MASS(C)DI: 156.8 GRAMS/M^2
BH CV ECHO MEAS - LV MAX PG: 4.8 MMHG
BH CV ECHO MEAS - LV MEAN PG: 2 MMHG
BH CV ECHO MEAS - LV SYSTOLIC VOL/BSA (12-30): 57.1 ML/M^2
BH CV ECHO MEAS - LV V1 MAX: 110 CM/SEC
BH CV ECHO MEAS - LV V1 MEAN: 72.8 CM/SEC
BH CV ECHO MEAS - LV V1 VTI: 24.5 CM
BH CV ECHO MEAS - LVIDD: 6.4 CM
BH CV ECHO MEAS - LVIDS: 4 CM
BH CV ECHO MEAS - LVLD AP2: 7.9 CM
BH CV ECHO MEAS - LVLD AP4: 9.3 CM
BH CV ECHO MEAS - LVLS AP2: 8.4 CM
BH CV ECHO MEAS - LVLS AP4: 8.3 CM
BH CV ECHO MEAS - LVOT AREA (M): 3.1 CM^2
BH CV ECHO MEAS - LVOT AREA: 3.1 CM^2
BH CV ECHO MEAS - LVOT DIAM: 2 CM
BH CV ECHO MEAS - LVPWD: 1.2 CM
BH CV ECHO MEAS - MPA AREA: 9.1 CM^2
BH CV ECHO MEAS - MPA DIAM: 3.4 CM
BH CV ECHO MEAS - MV A MAX VEL: 171 CM/SEC
BH CV ECHO MEAS - MV DEC TIME: 0.2 SEC
BH CV ECHO MEAS - MV E MAX VEL: 138 CM/SEC
BH CV ECHO MEAS - MV E/A: 0.81
BH CV ECHO MEAS - PA ACC SLOPE: 1507 CM/SEC^2
BH CV ECHO MEAS - PA ACC TIME: 0.08 SEC
BH CV ECHO MEAS - PA PR(ACCEL): 41.9 MMHG
BH CV ECHO MEAS - PI END-D VEL: 102.5 CM/SEC
BH CV ECHO MEAS - RAP SYSTOLE: 8 MMHG
BH CV ECHO MEAS - SI(AO): 221 ML/M^2
BH CV ECHO MEAS - SI(CUBED): 101.8 ML/M^2
BH CV ECHO MEAS - SI(LVOT): 40.3 ML/M^2
BH CV ECHO MEAS - SI(MOD-SP2): 55.5 ML/M^2
BH CV ECHO MEAS - SI(MOD-SP4): 58.7 ML/M^2
BH CV ECHO MEAS - SI(TEICH): 71.7 ML/M^2
BH CV ECHO MEAS - SV(AO): 421.9 ML
BH CV ECHO MEAS - SV(CUBED): 194.4 ML
BH CV ECHO MEAS - SV(LVOT): 77 ML
BH CV ECHO MEAS - SV(MOD-SP2): 106 ML
BH CV ECHO MEAS - SV(MOD-SP4): 112 ML
BH CV ECHO MEAS - SV(TEICH): 136.9 ML
BH CV ECHO MEAS - TAPSE (>1.6): 2.3 CM2
BH CV VAS BP LEFT ARM: NORMAL MMHG
BH CV XLRA - RV BASE: 4.7 CM
BH CV XLRA - RV LENGTH: 8.3 CM
BH CV XLRA - RV MID: 4 CM
BH CV XLRA - TDI S': 15.9 CM/SEC
LEFT ATRIUM VOLUME INDEX: 68.6 ML/M2
LV EF 2D ECHO EST: 45 %

## 2017-10-17 PROCEDURE — 93306 TTE W/DOPPLER COMPLETE: CPT

## 2017-10-17 PROCEDURE — 93306 TTE W/DOPPLER COMPLETE: CPT | Performed by: INTERNAL MEDICINE

## 2017-10-18 ENCOUNTER — DOCUMENTATION (OUTPATIENT)
Dept: CARDIOLOGY | Facility: HOSPITAL | Age: 67
End: 2017-10-18

## 2017-10-18 DIAGNOSIS — R06.00 DYSPNEA, UNSPECIFIED TYPE: Primary | ICD-10-CM

## 2017-10-18 DIAGNOSIS — J90 PLEURAL EFFUSION: ICD-10-CM

## 2017-10-18 PROBLEM — I42.9 CARDIOMYOPATHY (HCC): Status: ACTIVE | Noted: 2017-10-18

## 2017-10-18 NOTE — PROGRESS NOTES
Have you seen any other physician or provider since your last visit yes [facility]    Have you had any other diagnostic tests since your last visit? no    Have you changed or stopped any medications since your last visit? no

## 2017-10-18 NOTE — PROGRESS NOTES
Called and reviewed echo results with pt.    · Echocardiogram 45%, mild AF, aortic valve mean pressure gradient 10 mmHg, mild MR, less than 1 cm circumferential pericardial effusion, small-sized left pleural effusion (previous EF unknown)      Pt states she is breathing slightly better with inhaler, but very little activity causes SOB.  Swelling in face and legs have improved. Denies CP, pressure, dizziness, syncope.   -170's in am before taking morning meds.    Pt is scheduled to see PCP today.  She is awaiting nephrology appointment to be scheduled.    Pt will go today and have CXR completed at [facility].

## 2017-10-18 NOTE — PROGRESS NOTES
Pts appointment with Dr. Chowdhury as been moved up to 10/23/17.  Pt is also scheduled to see nephrology 11/07/17.

## 2017-10-20 DIAGNOSIS — R91.8 LUNG MASS: ICD-10-CM

## 2017-10-20 DIAGNOSIS — R06.00 DYSPNEA, UNSPECIFIED TYPE: Primary | ICD-10-CM

## 2017-10-20 NOTE — PROGRESS NOTES
Received chest x-ray report from [facility] ordered on 10/18/17:   A 6 cm mass in the right upper lobe was noted.Possible small right sided effusion.       Recommended a CT of chest with contrast.   Patient will be referred to pulmonary lung nodule clinic.  Discussed with pulmonary referral coordinator.  Due to patient's elevated creatinine of 2.2 we'll order CT of the chest without contrast.  Referral be completed for pulmonary    Lab results completed 10/18/17  WBC 11.4, hemoglobin A1c 7, hematocrit 28.9, platelet 4:15  Potassium 4.7, chloride 108, CO2 18, BUN 29, creatinine 2.2, estimated GFR 22, ALT 6, AST 13  Total cholesterol 81, triglycerides 77, HDL 33, LDL 33  TSH 2.7  Hemoglobin A1c 5.2

## 2017-10-25 ENCOUNTER — DOCUMENTATION (OUTPATIENT)
Dept: CARDIOLOGY | Facility: HOSPITAL | Age: 67
End: 2017-10-25

## 2017-10-25 NOTE — PROGRESS NOTES
Noted that pt cancelled CT of chest scheduled for 10/23/17. Lung Nodule clinic reports they will contact pt to have rescheduled prior to pulmonary appointment on 10/31/17.

## 2017-10-27 ENCOUNTER — DOCUMENTATION (OUTPATIENT)
Dept: CARDIOLOGY | Facility: HOSPITAL | Age: 67
End: 2017-10-27

## 2017-10-27 NOTE — PROGRESS NOTES
Noted that pt did not have CT of chest completed this week as order.  Did no f/u with Cardiology as scheduled.  Called to f/u.  Pt was admitted to [facility] this week and had a biopsy during hospital stay.  She will f/u as directed.

## 2017-11-05 PROBLEM — Z82.49 FAMILY HISTORY OF CARDIOVASCULAR DISEASE: Status: ACTIVE | Noted: 2017-01-01

## 2017-11-05 PROBLEM — N18.4 KIDNEY DISEASE, CHRONIC, STAGE IV (GFR 15-29 ML/MIN) (HCC): Status: ACTIVE | Noted: 2017-01-01

## 2017-11-05 PROBLEM — I10 ESSENTIAL HYPERTENSION: Status: ACTIVE | Noted: 2017-01-01

## 2017-11-05 PROBLEM — I50.9 CONGESTIVE HEART FAILURE (HCC): Status: ACTIVE | Noted: 2017-01-01

## 2017-11-05 PROBLEM — H54.7 LOSS OF VISION: Status: ACTIVE | Noted: 2017-01-01

## 2017-11-05 PROBLEM — I20.8 STABLE ANGINA (HCC): Status: ACTIVE | Noted: 2017-01-01

## 2017-11-05 PROBLEM — H26.9 CATARACT OF RIGHT EYE: Status: ACTIVE | Noted: 2017-01-01

## 2017-11-05 PROBLEM — J81.0 ACUTE PULMONARY EDEMA (HCC): Status: ACTIVE | Noted: 2017-01-01

## 2017-11-05 PROBLEM — B39.9 HISTOPLASMOSIS: Status: ACTIVE | Noted: 2017-01-01

## 2017-11-05 PROBLEM — L65.9 ALOPECIA: Status: ACTIVE | Noted: 2017-01-01

## 2017-11-05 PROBLEM — E78.5 HYPERLIPIDEMIA: Status: ACTIVE | Noted: 2017-01-01

## 2017-12-13 ENCOUNTER — OFFICE VISIT (OUTPATIENT)
Dept: CARDIAC SURGERY | Facility: CLINIC | Age: 67
End: 2017-12-13

## 2017-12-13 VITALS
HEIGHT: 63 IN | WEIGHT: 180 LBS | DIASTOLIC BLOOD PRESSURE: 98 MMHG | HEART RATE: 93 BPM | SYSTOLIC BLOOD PRESSURE: 169 MMHG | BODY MASS INDEX: 31.89 KG/M2

## 2017-12-13 DIAGNOSIS — C34.90 NON-SMALL CELL CARCINOMA OF LUNG (HCC): Primary | ICD-10-CM

## 2017-12-13 PROCEDURE — 99202 OFFICE O/P NEW SF 15 MIN: CPT | Performed by: THORACIC SURGERY (CARDIOTHORACIC VASCULAR SURGERY)

## 2017-12-13 RX ORDER — FUROSEMIDE 20 MG/1
20 TABLET ORAL 2 TIMES DAILY
COMMUNITY
End: 2017-12-18

## 2017-12-13 RX ORDER — VALERIAN ROOT 250 MG
1000 CAPSULE ORAL DAILY
COMMUNITY

## 2017-12-13 RX ORDER — CARVEDILOL 25 MG/1
25 TABLET ORAL 2 TIMES DAILY WITH MEALS
COMMUNITY

## 2017-12-13 NOTE — PROGRESS NOTES
12/13/2017  Patient Information  Johnathan Chaves                                                                                          [address] [facility] [address]   1950  'PCP/Referring Physician'  Felipe Summers MD  [phone]  No ref. provider found    Chief Complaint   Patient presents with   • Lung Cancer     Referred by Dr. Tyler Victoria for 2nd opinion for lung cancer       History of Present Illness:  The patient is a 67-year-old white female who has been referred to me for non-small cell carcinoma of the right upper lobe.  She has been admitted to [facility] and was seen by Dr. Long for this as well as the oncology there, Dr. Eldon Clark.  The right upper lobe mass is approximately 8 cm in diameter and is non-small cell carcinoma.  Her PET scan shows mediastinal lymphadenopathy which is certainly concerning for possible metastatic involvement with subcarinal involvement as well.  She has apparently a small amount of fluid in her right chest as well.  She is here for my opinion in regards to this.      Patient Active Problem List   Diagnosis   • CAD (coronary artery disease)   • Hypertension   • PAD (peripheral artery disease)   • Dyslipidemia   • Atherosclerotic renal artery stenosis, bilateral   • Bruit of left carotid artery   • Cardiomyopathy   • Non-small cell carcinoma of lung     Past Medical History:   Diagnosis Date   • CAD (coronary artery disease)     with nonobstructive plaque and less than 50% stenoses by catheterization, 06/25/2007.   • Dyslipidemia     (followed by primary care provider).   • Dyspepsia    • Hypertension    • PAD (peripheral artery disease)      Past Surgical History:   Procedure Laterality Date   • CORONARY ANGIOPLASTY WITH STENT PLACEMENT  2004   • EXTERNAL EAR SURGERY      as a child       Current Outpatient Prescriptions:   •  albuterol (PROVENTIL HFA;VENTOLIN HFA) 108 (90 Base) MCG/ACT inhaler, Inhale 2 puffs Every 6 (Six)  Hours As Needed for Wheezing or Shortness of Air., Disp: 1 inhaler, Rfl: 2  •  carvedilol (COREG) 25 MG tablet, Take 25 mg by mouth 2 (Two) Times a Day With Meals., Disp: , Rfl:   •  Fluticasone Furoate-Vilanterol (BREO ELLIPTA IN), Inhale., Disp: , Rfl:   •  furosemide (LASIX) 20 MG tablet, Take 20 mg by mouth 2 (Two) Times a Day., Disp: , Rfl:   •  NIFEdipine XL (PROCARDIA XL) 90 MG 24 hr tablet, TAKE ONE TABLET BY MOUTH ONCE DAILY, Disp: 90 tablet, Rfl: 1  •  nitroglycerin (NITROSTAT) 0.4 MG SL tablet, Place 0.4 mg under the tongue Every 5 (Five) Minutes As Needed for Chest Pain. Take no more than 3 doses in 15 minutes., Disp: , Rfl:   •  Tresa D Arco 1000 MG capsule, Take  by mouth., Disp: , Rfl:   •  simvastatin (ZOCOR) 40 MG tablet, TAKE ONE TABLET BY MOUTH ONCE NIGHTLY, Disp: 90 tablet, Rfl: 0  •  doxazosin (CARDURA) 2 MG tablet, Take 2 tablets by mouth Every Night., Disp: 30 tablet, Rfl: 1  •  labetalol (NORMODYNE) 300 MG tablet, TAKE ONE TABLET BY MOUTH ONCE DAILY, Disp: 90 tablet, Rfl: 3  •  lisinopril (PRINIVIL,ZESTRIL) 20 MG tablet, TAKE ONE TABLET BY MOUTH TWICE A DAY *MUST MAKE APPT FOR FURTHER REFILLS TO HAVE LABS TO CHECK KIDNEY FUNCTION, Disp: 180 tablet, Rfl: 3  No Known Allergies  Social History     Social History   • Marital status: Single     Spouse name: N/A   • Number of children: 1   • Years of education: N/A     Occupational History   •  Disabled     Social History Main Topics   • Smoking status: Former Smoker     Packs/day: 1.00     Years: 25.00     Types: Cigarettes     Quit date: 3/23/2005   • Smokeless tobacco: Never Used   • Alcohol use No   • Drug use: No   • Sexual activity: Defer     Other Topics Concern   • Not on file     Social History Narrative    Caffeine use: 3 servings soda daily.    Patient lives with her daughter in [place]         Family History   Problem Relation Age of Onset   • Diabetes Mother    • Hypertension Father    • Stroke Father    • Cancer Brother      liver   •  "No Known Problems Maternal Grandmother    • No Known Problems Maternal Grandfather    • No Known Problems Paternal Grandmother    • No Known Problems Paternal Grandfather      Review of Systems   Constitution: Positive for chills. Negative for fever, malaise/fatigue, night sweats and weight loss.   HENT: Positive for hearing loss. Negative for odynophagia and sore throat.    Cardiovascular: Positive for dyspnea on exertion and leg swelling. Negative for chest pain, orthopnea and palpitations.   Respiratory: Positive for cough and hemoptysis.    Endocrine: Negative for cold intolerance, heat intolerance, polydipsia, polyphagia and polyuria.   Hematologic/Lymphatic: Does not bruise/bleed easily.   Skin: Positive for itching. Negative for rash.   Musculoskeletal: Negative for joint pain, joint swelling and myalgias.   Gastrointestinal: Positive for dysphagia. Negative for abdominal pain, constipation, diarrhea, hematemesis, hematochezia, melena, nausea and vomiting.   Genitourinary: Negative for dysuria, frequency and hematuria.   Neurological: Negative for focal weakness, headaches, numbness and seizures.   Psychiatric/Behavioral: Negative for suicidal ideas.   All other systems reviewed and are negative.    Vitals:    12/13/17 0810   BP: 169/98   BP Location: Left arm   Patient Position: Sitting   Pulse: 93   Weight: 81.6 kg (180 lb)   Height: 160 cm (63\")      Physical Exam   Constitutional: She is oriented to person, place, and time. She appears well-developed and well-nourished. No distress.   HENT:   Head: Normocephalic.   Eyes: EOM are normal. Pupils are equal, round, and reactive to light.   Neck: Normal range of motion. Carotid bruit is not present. No thyromegaly present.   Cardiovascular: Normal rate and regular rhythm.  Exam reveals no gallop and no friction rub.    No murmur heard.  Pulmonary/Chest: She has no wheezes. She has no rales.   Abdominal: Soft. Bowel sounds are normal. She exhibits no distension " and no mass. There is no hepatomegaly. There is no tenderness.   Musculoskeletal: Normal range of motion. She exhibits no deformity.   Neurological: She is alert and oriented to person, place, and time. She has normal strength. No cranial nerve deficit or sensory deficit.   Skin: No bruising and no petechiae noted. There is erythema. No cyanosis. Nails show no clubbing.   Psychiatric: She has a normal mood and affect.       Labs/Imaging:  I obtained and reviewed medical records from Dr. Victoria's office including the PET scan demonstrating possible mediastinal involvement.    Assessment/Plan:   The patient is a 67-year-old white female who has seen Dr. Leeroy Tony in the past for cardiac follow-up.  The patient now presents with a non-small cell carcinoma that is 8 cm in the right upper lobe.  She has evidence on PET scan a possible mediastinal involvement.  After careful review, I think at this point the best option would be for us to proceed with a CT of the head with no contrast.  She apparently has metal plates in her ears from the past and they are concerned about an MRI and I concur with that.  So we will get a CT of the head with no contrast.  I think also we need to do a EBUS of the mediastinal lymphadenopathy to see if she is in a stage III tumor.  She certainly will be an elevated surgical risk.  I have discussed this very frankly with both her and her daughter and answered all of her questions.  They appeared to understand all the above.  I will see her back in follow-up after this.      Patient Active Problem List   Diagnosis   • CAD (coronary artery disease)   • Hypertension   • PAD (peripheral artery disease)   • Dyslipidemia   • Atherosclerotic renal artery stenosis, bilateral   • Bruit of left carotid artery   • Cardiomyopathy   • Non-small cell carcinoma of lung     Signed by: Kory Hanley M.D.    12/13/2017    CC:  MD Aleta Ribeiro, , editing for Kory HERNÁNDEZ  KODAK Hanley    I, Kory Hanley MD, have read and agree with the editing done by Aleta Ye, .

## 2017-12-14 DIAGNOSIS — C34.91 MALIGNANT NEOPLASM OF RIGHT LUNG, UNSPECIFIED PART OF LUNG (HCC): Primary | ICD-10-CM

## 2017-12-14 DIAGNOSIS — C34.90 LUNG CANCER, PRIMARY, WITH METASTASIS FROM LUNG TO OTHER SITE, UNSPECIFIED LATERALITY (HCC): Primary | ICD-10-CM

## 2017-12-15 ENCOUNTER — PREP FOR SURGERY (OUTPATIENT)
Dept: OTHER | Facility: HOSPITAL | Age: 67
End: 2017-12-15

## 2017-12-15 DIAGNOSIS — C34.90 MALIGNANT NEOPLASM OF LUNG, UNSPECIFIED LATERALITY, UNSPECIFIED PART OF LUNG (HCC): Primary | ICD-10-CM

## 2017-12-15 PROBLEM — C34.91 MALIGNANT NEOPLASM OF RIGHT LUNG (HCC): Status: ACTIVE | Noted: 2017-12-15

## 2017-12-18 ENCOUNTER — HOSPITAL ENCOUNTER (OUTPATIENT)
Dept: CT IMAGING | Facility: HOSPITAL | Age: 67
Discharge: HOME | End: 2017-12-18
Admitting: PHYSICIAN ASSISTANT

## 2017-12-18 ENCOUNTER — APPOINTMENT (OUTPATIENT)
Dept: PREADMISSION TESTING | Facility: HOSPITAL | Age: 67
End: 2017-12-18

## 2017-12-18 VITALS — BODY MASS INDEX: 31.84 KG/M2 | HEIGHT: 64 IN | WEIGHT: 186.51 LBS

## 2017-12-18 DIAGNOSIS — C34.90 MALIGNANT NEOPLASM OF LUNG, UNSPECIFIED LATERALITY, UNSPECIFIED PART OF LUNG (HCC): ICD-10-CM

## 2017-12-18 DIAGNOSIS — C34.90 LUNG CANCER, PRIMARY, WITH METASTASIS FROM LUNG TO OTHER SITE, UNSPECIFIED LATERALITY (HCC): ICD-10-CM

## 2017-12-18 LAB
ALBUMIN SERPL-MCNC: 3.9 G/DL (ref 3.2–4.8)
ALBUMIN/GLOB SERPL: 1.7 G/DL (ref 1.5–2.5)
ALP SERPL-CCNC: 113 U/L (ref 25–100)
ALT SERPL W P-5'-P-CCNC: 14 U/L (ref 7–40)
ANION GAP SERPL CALCULATED.3IONS-SCNC: 7 MMOL/L (ref 3–11)
APTT PPP: 30 SECONDS (ref 24–31)
AST SERPL-CCNC: 19 U/L (ref 0–33)
BILIRUB SERPL-MCNC: 0.4 MG/DL (ref 0.3–1.2)
BUN BLD-MCNC: 22 MG/DL (ref 9–23)
BUN/CREAT SERPL: 12.9 (ref 7–25)
CALCIUM SPEC-SCNC: 8.1 MG/DL (ref 8.7–10.4)
CHLORIDE SERPL-SCNC: 110 MMOL/L (ref 99–109)
CO2 SERPL-SCNC: 25 MMOL/L (ref 20–31)
CREAT BLD-MCNC: 1.7 MG/DL (ref 0.6–1.3)
DEPRECATED RDW RBC AUTO: 44 FL (ref 37–54)
ERYTHROCYTE [DISTWIDTH] IN BLOOD BY AUTOMATED COUNT: 16.1 % (ref 11.3–14.5)
GFR SERPL CREATININE-BSD FRML MDRD: 30 ML/MIN/1.73
GLOBULIN UR ELPH-MCNC: 2.3 GM/DL
GLUCOSE BLD-MCNC: 113 MG/DL (ref 70–100)
HBA1C MFR BLD: 5.7 % (ref 4.8–5.6)
HCT VFR BLD AUTO: 31.7 % (ref 34.5–44)
HGB BLD-MCNC: 9.5 G/DL (ref 11.5–15.5)
INR PPP: 1.03
MCH RBC QN AUTO: 22.5 PG (ref 27–31)
MCHC RBC AUTO-ENTMCNC: 30 G/DL (ref 32–36)
MCV RBC AUTO: 75.1 FL (ref 80–99)
PLATELET # BLD AUTO: 385 10*3/MM3 (ref 150–450)
PMV BLD AUTO: 7.9 FL (ref 6–12)
POTASSIUM BLD-SCNC: 3.7 MMOL/L (ref 3.5–5.5)
PROT SERPL-MCNC: 6.2 G/DL (ref 5.7–8.2)
PROTHROMBIN TIME: 11.2 SECONDS (ref 9.6–11.5)
RBC # BLD AUTO: 4.22 10*6/MM3 (ref 3.89–5.14)
SODIUM BLD-SCNC: 142 MMOL/L (ref 132–146)
WBC NRBC COR # BLD: 16.89 10*3/MM3 (ref 3.5–10.8)

## 2017-12-18 PROCEDURE — 70450 CT HEAD/BRAIN W/O DYE: CPT

## 2017-12-18 PROCEDURE — 83036 HEMOGLOBIN GLYCOSYLATED A1C: CPT | Performed by: PHYSICIAN ASSISTANT

## 2017-12-18 PROCEDURE — 85610 PROTHROMBIN TIME: CPT | Performed by: PHYSICIAN ASSISTANT

## 2017-12-18 PROCEDURE — 85027 COMPLETE CBC AUTOMATED: CPT | Performed by: PHYSICIAN ASSISTANT

## 2017-12-18 PROCEDURE — 80053 COMPREHEN METABOLIC PANEL: CPT | Performed by: PHYSICIAN ASSISTANT

## 2017-12-18 PROCEDURE — 36415 COLL VENOUS BLD VENIPUNCTURE: CPT

## 2017-12-18 PROCEDURE — 85730 THROMBOPLASTIN TIME PARTIAL: CPT | Performed by: PHYSICIAN ASSISTANT

## 2017-12-18 RX ORDER — ALBUTEROL SULFATE 2.5 MG/3ML
2.5 SOLUTION RESPIRATORY (INHALATION) 3 TIMES DAILY
COMMUNITY

## 2017-12-18 NOTE — DISCHARGE INSTRUCTIONS
The following information and instructions were given:    NPO after MN except sips of water with routine prescribed medication (except blood thinner, diabetes, or weight reducing medication) unless otherwise instructed by your physician.  Do not eat, drink, smoke or chew gum after MN the night before surgery. This also includes no mints.    DO NOT shave for two days before your procedure.  Do not wear makeup.      DO NOT wear fingernail polish (gel/regular) and/or acrylic/artificial nails on the day of surgery.   If a patient had recent manicure and would rather not remove polish or artificial nails, then the minimum requirement is that the polish/artificial nails must be removed from the middle finger on each hand.      If patient was having surgery on an upper extremity, then the patient was instructed that fingernail polish/artificial fingernails must be removed for surgery.  NO EXCEPTIONS.      If patient was having surgery on a lower extremity, then the patient was instructed that toenail polish on both extremities must be removed for surgery.  NO EXCEPTIONS.    Remove all jewelry (advised to go to jeweler if unable to remove).  Jewelry especially rings can no longer be taped for surgery.    Leave anything you consider valuable at home.    Leave your suitcase in the car until after your surgery.    Bring the following with you (if applicable)   -picture ID and insurance cards   -Co-pay/deductible required by insurance   -Medications in the original bottles (not a list) including all over-the-counter  medications if not brought to PAT   -Copy of advance directive, living will or power of  documents if not  brought to PAT   -CPAP or BIPAP mask and tubing (do not bring machine)   -Skin prep instructions sheet   -PAT Pass    Education booklet, brochure, handout or binder given to patient.    Pain Control After Surgery handout given to patient.    Respirex use (handout given to patient) and pneumonia  prevention.    Signs and Symptoms of infection.    DVT Prevention stressing the importance of ambulation.    Patient to apply Chlorhexadine wipes to surgical area (as instructed) the night before procedure and the AM of procedure.    When applicable for ERAS patients (colon, orthropedic), patients were instructed to drink 20 ounces of Gatorade or G2 for diabetics (or until full) the morning of surgery.  The Gatorade or G2 must be consumed at least 3 hours before surgery start time.  No RED Gatorade or G2.  Appropriated ERAS handout given to patient during PAT visit.

## 2017-12-20 DIAGNOSIS — R91.1 LUNG NODULE: Primary | ICD-10-CM

## 2017-12-28 ENCOUNTER — ANESTHESIA EVENT (OUTPATIENT)
Dept: GASTROENTEROLOGY | Facility: HOSPITAL | Age: 67
End: 2017-12-28

## 2017-12-28 ENCOUNTER — HOSPITAL ENCOUNTER (OUTPATIENT)
Facility: HOSPITAL | Age: 67
Setting detail: HOSPITAL OUTPATIENT SURGERY
Discharge: HOME | End: 2017-12-28
Attending: THORACIC SURGERY (CARDIOTHORACIC VASCULAR SURGERY) | Admitting: THORACIC SURGERY (CARDIOTHORACIC VASCULAR SURGERY)

## 2017-12-28 ENCOUNTER — APPOINTMENT (OUTPATIENT)
Dept: GENERAL RADIOLOGY | Facility: HOSPITAL | Age: 67
End: 2017-12-28

## 2017-12-28 ENCOUNTER — ANESTHESIA (OUTPATIENT)
Dept: GASTROENTEROLOGY | Facility: HOSPITAL | Age: 67
End: 2017-12-28

## 2017-12-28 VITALS
HEART RATE: 72 BPM | BODY MASS INDEX: 32.07 KG/M2 | SYSTOLIC BLOOD PRESSURE: 164 MMHG | TEMPERATURE: 97.2 F | WEIGHT: 181 LBS | RESPIRATION RATE: 20 BRPM | DIASTOLIC BLOOD PRESSURE: 82 MMHG | OXYGEN SATURATION: 94 % | HEIGHT: 63 IN

## 2017-12-28 DIAGNOSIS — C34.90 MALIGNANT NEOPLASM OF LUNG, UNSPECIFIED LATERALITY, UNSPECIFIED PART OF LUNG (HCC): ICD-10-CM

## 2017-12-28 DIAGNOSIS — C34.91 MALIGNANT NEOPLASM OF RIGHT LUNG, UNSPECIFIED PART OF LUNG (HCC): ICD-10-CM

## 2017-12-28 LAB — POTASSIUM BLDA-SCNC: 3.85 MMOL/L (ref 3.5–5.3)

## 2017-12-28 PROCEDURE — 88305 TISSUE EXAM BY PATHOLOGIST: CPT | Performed by: THORACIC SURGERY (CARDIOTHORACIC VASCULAR SURGERY)

## 2017-12-28 PROCEDURE — 88112 CYTOPATH CELL ENHANCE TECH: CPT | Performed by: THORACIC SURGERY (CARDIOTHORACIC VASCULAR SURGERY)

## 2017-12-28 PROCEDURE — S0260 H&P FOR SURGERY: HCPCS | Performed by: THORACIC SURGERY (CARDIOTHORACIC VASCULAR SURGERY)

## 2017-12-28 PROCEDURE — 25010000002 ONDANSETRON PER 1 MG: Performed by: NURSE ANESTHETIST, CERTIFIED REGISTERED

## 2017-12-28 PROCEDURE — 94799 UNLISTED PULMONARY SVC/PX: CPT

## 2017-12-28 PROCEDURE — 94640 AIRWAY INHALATION TREATMENT: CPT

## 2017-12-28 PROCEDURE — 94760 N-INVAS EAR/PLS OXIMETRY 1: CPT

## 2017-12-28 PROCEDURE — 71010 HC CHEST PA OR AP: CPT

## 2017-12-28 PROCEDURE — 25010000002 DEXAMETHASONE PER 1 MG: Performed by: NURSE ANESTHETIST, CERTIFIED REGISTERED

## 2017-12-28 PROCEDURE — 25010000002 PROPOFOL 10 MG/ML EMULSION: Performed by: NURSE ANESTHETIST, CERTIFIED REGISTERED

## 2017-12-28 PROCEDURE — 88342 IMHCHEM/IMCYTCHM 1ST ANTB: CPT | Performed by: THORACIC SURGERY (CARDIOTHORACIC VASCULAR SURGERY)

## 2017-12-28 PROCEDURE — 31652 BRONCH EBUS SAMPLNG 1/2 NODE: CPT | Performed by: THORACIC SURGERY (CARDIOTHORACIC VASCULAR SURGERY)

## 2017-12-28 PROCEDURE — 25010000002 NEOSTIGMINE 10 MG/10ML SOLUTION: Performed by: NURSE ANESTHETIST, CERTIFIED REGISTERED

## 2017-12-28 PROCEDURE — 84132 ASSAY OF SERUM POTASSIUM: CPT | Performed by: ANESTHESIOLOGY

## 2017-12-28 RX ORDER — ATRACURIUM BESYLATE 10 MG/ML
INJECTION, SOLUTION INTRAVENOUS AS NEEDED
Status: DISCONTINUED | OUTPATIENT
Start: 2017-12-28 | End: 2017-12-28 | Stop reason: SURG

## 2017-12-28 RX ORDER — PROPOFOL 10 MG/ML
VIAL (ML) INTRAVENOUS CONTINUOUS PRN
Status: DISCONTINUED | OUTPATIENT
Start: 2017-12-28 | End: 2017-12-28 | Stop reason: SURG

## 2017-12-28 RX ORDER — DEXAMETHASONE SODIUM PHOSPHATE 4 MG/ML
INJECTION, SOLUTION INTRA-ARTICULAR; INTRALESIONAL; INTRAMUSCULAR; INTRAVENOUS; SOFT TISSUE AS NEEDED
Status: DISCONTINUED | OUTPATIENT
Start: 2017-12-28 | End: 2017-12-28 | Stop reason: SURG

## 2017-12-28 RX ORDER — HYDROMORPHONE HYDROCHLORIDE 1 MG/ML
0.5 INJECTION, SOLUTION INTRAMUSCULAR; INTRAVENOUS; SUBCUTANEOUS
Status: DISCONTINUED | OUTPATIENT
Start: 2017-12-28 | End: 2017-12-28 | Stop reason: HOSPADM

## 2017-12-28 RX ORDER — ONDANSETRON 2 MG/ML
4 INJECTION INTRAMUSCULAR; INTRAVENOUS ONCE AS NEEDED
Status: DISCONTINUED | OUTPATIENT
Start: 2017-12-28 | End: 2017-12-28 | Stop reason: HOSPADM

## 2017-12-28 RX ORDER — GLYCOPYRROLATE 0.2 MG/ML
INJECTION INTRAMUSCULAR; INTRAVENOUS AS NEEDED
Status: DISCONTINUED | OUTPATIENT
Start: 2017-12-28 | End: 2017-12-28 | Stop reason: SURG

## 2017-12-28 RX ORDER — MAGNESIUM HYDROXIDE 1200 MG/15ML
LIQUID ORAL AS NEEDED
Status: DISCONTINUED | OUTPATIENT
Start: 2017-12-28 | End: 2017-12-28 | Stop reason: HOSPADM

## 2017-12-28 RX ORDER — LIDOCAINE HYDROCHLORIDE 10 MG/ML
0.5 INJECTION, SOLUTION EPIDURAL; INFILTRATION; INTRACAUDAL; PERINEURAL ONCE AS NEEDED
Status: DISCONTINUED | OUTPATIENT
Start: 2017-12-28 | End: 2017-12-28 | Stop reason: HOSPADM

## 2017-12-28 RX ORDER — SODIUM CHLORIDE, SODIUM LACTATE, POTASSIUM CHLORIDE, CALCIUM CHLORIDE 600; 310; 30; 20 MG/100ML; MG/100ML; MG/100ML; MG/100ML
9 INJECTION, SOLUTION INTRAVENOUS CONTINUOUS PRN
Status: DISCONTINUED | OUTPATIENT
Start: 2017-12-28 | End: 2017-12-28 | Stop reason: HOSPADM

## 2017-12-28 RX ORDER — LIDOCAINE HYDROCHLORIDE 10 MG/ML
INJECTION, SOLUTION EPIDURAL; INFILTRATION; INTRACAUDAL; PERINEURAL AS NEEDED
Status: DISCONTINUED | OUTPATIENT
Start: 2017-12-28 | End: 2017-12-28 | Stop reason: SURG

## 2017-12-28 RX ORDER — SODIUM CHLORIDE 9 MG/ML
50 INJECTION, SOLUTION INTRAVENOUS CONTINUOUS
Status: DISCONTINUED | OUTPATIENT
Start: 2017-12-28 | End: 2017-12-29 | Stop reason: HOSPADM

## 2017-12-28 RX ORDER — SODIUM CHLORIDE 0.9 % (FLUSH) 0.9 %
1-10 SYRINGE (ML) INJECTION AS NEEDED
Status: DISCONTINUED | OUTPATIENT
Start: 2017-12-28 | End: 2017-12-28 | Stop reason: HOSPADM

## 2017-12-28 RX ORDER — FENTANYL CITRATE 50 UG/ML
50 INJECTION, SOLUTION INTRAMUSCULAR; INTRAVENOUS
Status: DISCONTINUED | OUTPATIENT
Start: 2017-12-28 | End: 2017-12-28 | Stop reason: HOSPADM

## 2017-12-28 RX ORDER — ONDANSETRON 2 MG/ML
INJECTION INTRAMUSCULAR; INTRAVENOUS AS NEEDED
Status: DISCONTINUED | OUTPATIENT
Start: 2017-12-28 | End: 2017-12-28 | Stop reason: SURG

## 2017-12-28 RX ORDER — PROPOFOL 10 MG/ML
VIAL (ML) INTRAVENOUS AS NEEDED
Status: DISCONTINUED | OUTPATIENT
Start: 2017-12-28 | End: 2017-12-28 | Stop reason: SURG

## 2017-12-28 RX ORDER — NEOSTIGMINE METHYLSULFATE 1 MG/ML
INJECTION, SOLUTION INTRAVENOUS AS NEEDED
Status: DISCONTINUED | OUTPATIENT
Start: 2017-12-28 | End: 2017-12-28 | Stop reason: SURG

## 2017-12-28 RX ADMIN — ATRACURIUM BESYLATE 30 MG: 10 INJECTION, SOLUTION INTRAVENOUS at 08:30

## 2017-12-28 RX ADMIN — LIDOCAINE HYDROCHLORIDE 100 MG: 10 INJECTION, SOLUTION EPIDURAL; INFILTRATION; INTRACAUDAL; PERINEURAL at 08:30

## 2017-12-28 RX ADMIN — GLYCOPYRROLATE 0.4 MG: 0.2 INJECTION, SOLUTION INTRAMUSCULAR; INTRAVENOUS at 08:53

## 2017-12-28 RX ADMIN — NEOSTIGMINE METHYLSULFATE 3 MG: 1 INJECTION, SOLUTION INTRAVENOUS at 08:53

## 2017-12-28 RX ADMIN — ALBUTEROL SULFATE 2.5 MG: 2.5 SOLUTION RESPIRATORY (INHALATION) at 07:45

## 2017-12-28 RX ADMIN — SODIUM CHLORIDE: 9 INJECTION, SOLUTION INTRAVENOUS at 08:27

## 2017-12-28 RX ADMIN — PROPOFOL 50 MG: 10 INJECTION, EMULSION INTRAVENOUS at 08:58

## 2017-12-28 RX ADMIN — ONDANSETRON 4 MG: 2 INJECTION INTRAMUSCULAR; INTRAVENOUS at 08:53

## 2017-12-28 RX ADMIN — DEXAMETHASONE SODIUM PHOSPHATE 4 MG: 4 INJECTION, SOLUTION INTRAMUSCULAR; INTRAVENOUS at 08:36

## 2017-12-28 RX ADMIN — PROPOFOL 25 MCG/KG/MIN: 10 INJECTION, EMULSION INTRAVENOUS at 08:34

## 2017-12-28 RX ADMIN — PROPOFOL 50 MG: 10 INJECTION, EMULSION INTRAVENOUS at 09:01

## 2017-12-28 RX ADMIN — PROPOFOL 100 MG: 10 INJECTION, EMULSION INTRAVENOUS at 08:30

## 2017-12-28 RX ADMIN — FAMOTIDINE 20 MG: 10 INJECTION, SOLUTION INTRAVENOUS at 07:46

## 2017-12-29 LAB
LAB AP CASE REPORT: NORMAL
Lab: NORMAL
PATH REPORT.FINAL DX SPEC: NORMAL

## 2018-01-01 ENCOUNTER — OFFICE VISIT (OUTPATIENT)
Dept: FAMILY MEDICINE CLINIC | Age: 68
End: 2018-01-01
Payer: MEDICARE

## 2018-01-01 ENCOUNTER — TELEPHONE (OUTPATIENT)
Dept: FAMILY MEDICINE CLINIC | Age: 68
End: 2018-01-01

## 2018-01-01 ENCOUNTER — HOSPITAL ENCOUNTER (OUTPATIENT)
Dept: OTHER | Age: 68
Discharge: HOME | End: 2018-01-11
Attending: NURSE PRACTITIONER | Admitting: NURSE PRACTITIONER

## 2018-01-01 VITALS
BODY MASS INDEX: 32.78 KG/M2 | OXYGEN SATURATION: 94 % | DIASTOLIC BLOOD PRESSURE: 82 MMHG | HEIGHT: 64 IN | WEIGHT: 192 LBS | HEART RATE: 72 BPM | SYSTOLIC BLOOD PRESSURE: 138 MMHG | RESPIRATION RATE: 18 BRPM

## 2018-01-01 DIAGNOSIS — B39.9 HISTOPLASMOSIS: ICD-10-CM

## 2018-01-01 DIAGNOSIS — C34.91 MALIGNANT NEOPLASM OF RIGHT LUNG, UNSPECIFIED PART OF LUNG (HCC): Primary | ICD-10-CM

## 2018-01-01 DIAGNOSIS — J44.9 CHRONIC OBSTRUCTIVE PULMONARY DISEASE, UNSPECIFIED COPD TYPE (HCC): ICD-10-CM

## 2018-01-01 DIAGNOSIS — I10 ESSENTIAL HYPERTENSION: ICD-10-CM

## 2018-01-01 DIAGNOSIS — R73.9 HYPERGLYCEMIA: ICD-10-CM

## 2018-01-01 DIAGNOSIS — N18.4 KIDNEY DISEASE, CHRONIC, STAGE IV (GFR 15-29 ML/MIN) (HCC): ICD-10-CM

## 2018-01-01 DIAGNOSIS — R05.9 COUGH: ICD-10-CM

## 2018-01-01 DIAGNOSIS — R11.2 NAUSEA AND VOMITING, INTRACTABILITY OF VOMITING NOT SPECIFIED, UNSPECIFIED VOMITING TYPE: ICD-10-CM

## 2018-01-01 DIAGNOSIS — M79.89 LEG SWELLING: ICD-10-CM

## 2018-01-01 DIAGNOSIS — I50.9 CONGESTIVE HEART FAILURE, UNSPECIFIED CONGESTIVE HEART FAILURE CHRONICITY, UNSPECIFIED CONGESTIVE HEART FAILURE TYPE: ICD-10-CM

## 2018-01-01 LAB
A/G RATIO: 1.7 (ref 0.8–2)
ALBUMIN SERPL-MCNC: 3.7 G/DL (ref 3.4–4.8)
ALP BLD-CCNC: 104 U/L (ref 25–100)
ALT SERPL-CCNC: 6 U/L (ref 4–36)
ANION GAP SERPL CALCULATED.3IONS-SCNC: 13 MMOL/L (ref 3–16)
AST SERPL-CCNC: 10 U/L (ref 8–33)
BASOPHILS ABSOLUTE: 0.1 K/UL (ref 0–0.1)
BASOPHILS RELATIVE PERCENT: 0.6 %
BILIRUB SERPL-MCNC: 0.3 MG/DL (ref 0.3–1.2)
BUN BLDV-MCNC: 26 MG/DL (ref 6–20)
CALCIUM SERPL-MCNC: 8.3 MG/DL (ref 8.5–10.5)
CHLORIDE BLD-SCNC: 103 MMOL/L (ref 98–107)
CO2: 26 MMOL/L (ref 20–30)
CREAT SERPL-MCNC: 2 MG/DL (ref 0.4–1.2)
EOSINOPHILS ABSOLUTE: 0.2 K/UL (ref 0–0.4)
EOSINOPHILS RELATIVE PERCENT: 1.1 %
GFR AFRICAN AMERICAN: 30
GFR NON-AFRICAN AMERICAN: 25
GLOBULIN: 2.2 G/DL
GLUCOSE BLD-MCNC: 126 MG/DL (ref 74–106)
HBA1C MFR BLD: 5.5 %
HCT VFR BLD CALC: 31.5 % (ref 37–47)
HEMOGLOBIN: 8.8 G/DL (ref 11.5–16.5)
IMMATURE GRANULOCYTES #: 0.1 K/UL
IMMATURE GRANULOCYTES %: 0.5 % (ref 0–5)
LYMPHOCYTES ABSOLUTE: 1.7 K/UL (ref 1.5–4)
LYMPHOCYTES RELATIVE PERCENT: 11.1 %
MCH RBC QN AUTO: 21.6 PG (ref 27–32)
MCHC RBC AUTO-ENTMCNC: 27.9 G/DL (ref 31–35)
MCV RBC AUTO: 77.4 FL (ref 80–100)
MONOCYTES ABSOLUTE: 1.2 K/UL (ref 0.2–0.8)
MONOCYTES RELATIVE PERCENT: 7.5 %
NEUTROPHILS ABSOLUTE: 12.2 K/UL (ref 2–7.5)
NEUTROPHILS RELATIVE PERCENT: 79.2 %
PDW BLD-RTO: 16.9 % (ref 11–16)
PLATELET # BLD: 429 K/UL (ref 150–400)
PMV BLD AUTO: 9.3 FL (ref 6–10)
POTASSIUM SERPL-SCNC: 4 MMOL/L (ref 3.4–5.1)
RBC # BLD: 4.07 M/UL (ref 3.8–5.8)
SODIUM BLD-SCNC: 142 MMOL/L (ref 136–145)
TOTAL PROTEIN: 5.9 G/DL (ref 6.4–8.3)
WBC # BLD: 15.4 K/UL (ref 4–11)

## 2018-01-01 PROCEDURE — G8926 SPIRO NO PERF OR DOC: HCPCS | Performed by: NURSE PRACTITIONER

## 2018-01-01 PROCEDURE — 1123F ACP DISCUSS/DSCN MKR DOCD: CPT | Performed by: NURSE PRACTITIONER

## 2018-01-01 PROCEDURE — 3017F COLORECTAL CA SCREEN DOC REV: CPT | Performed by: NURSE PRACTITIONER

## 2018-01-01 PROCEDURE — G8599 NO ASA/ANTIPLAT THER USE RNG: HCPCS | Performed by: NURSE PRACTITIONER

## 2018-01-01 PROCEDURE — G8400 PT W/DXA NO RESULTS DOC: HCPCS | Performed by: NURSE PRACTITIONER

## 2018-01-01 PROCEDURE — G8484 FLU IMMUNIZE NO ADMIN: HCPCS | Performed by: NURSE PRACTITIONER

## 2018-01-01 PROCEDURE — 1090F PRES/ABSN URINE INCON ASSESS: CPT | Performed by: NURSE PRACTITIONER

## 2018-01-01 PROCEDURE — G8427 DOCREV CUR MEDS BY ELIG CLIN: HCPCS | Performed by: NURSE PRACTITIONER

## 2018-01-01 PROCEDURE — 3014F SCREEN MAMMO DOC REV: CPT | Performed by: NURSE PRACTITIONER

## 2018-01-01 PROCEDURE — 99214 OFFICE O/P EST MOD 30 MIN: CPT | Performed by: NURSE PRACTITIONER

## 2018-01-01 PROCEDURE — 1036F TOBACCO NON-USER: CPT | Performed by: NURSE PRACTITIONER

## 2018-01-01 PROCEDURE — G8417 CALC BMI ABV UP PARAM F/U: HCPCS | Performed by: NURSE PRACTITIONER

## 2018-01-01 PROCEDURE — 4040F PNEUMOC VAC/ADMIN/RCVD: CPT | Performed by: NURSE PRACTITIONER

## 2018-01-01 PROCEDURE — 3023F SPIROM DOC REV: CPT | Performed by: NURSE PRACTITIONER

## 2018-01-01 RX ORDER — DEXTROMETHORPHAN HYDROBROMIDE AND PROMETHAZINE HYDROCHLORIDE 15; 6.25 MG/5ML; MG/5ML
5 SYRUP ORAL 4 TIMES DAILY PRN
Qty: 473 ML | Refills: 3 | Status: SHIPPED | OUTPATIENT
Start: 2018-01-01 | End: 2018-01-01

## 2018-01-01 RX ORDER — B-COMPLEX WITH VITAMIN C
1 TABLET ORAL DAILY
Qty: 30 TABLET | Refills: 0 | Status: SHIPPED | OUTPATIENT
Start: 2018-01-01 | End: 2019-01-16

## 2018-01-01 RX ORDER — FLUTICASONE FUROATE AND VILANTEROL 100; 25 UG/1; UG/1
1 POWDER RESPIRATORY (INHALATION) DAILY
COMMUNITY

## 2018-01-01 RX ORDER — PROMETHAZINE HYDROCHLORIDE 25 MG/1
25 TABLET ORAL EVERY 6 HOURS PRN
Qty: 30 TABLET | Refills: 1 | Status: SHIPPED | OUTPATIENT
Start: 2018-01-01 | End: 2018-01-01 | Stop reason: SDUPTHER

## 2018-01-01 RX ORDER — ALBUTEROL SULFATE 90 UG/1
2 AEROSOL, METERED RESPIRATORY (INHALATION) EVERY 6 HOURS PRN
Qty: 1 INHALER | Refills: 3 | Status: SHIPPED | OUTPATIENT
Start: 2018-01-01

## 2018-01-01 RX ORDER — TRAZODONE HYDROCHLORIDE 50 MG/1
TABLET ORAL
Qty: 60 TABLET | Refills: 3 | Status: SHIPPED | OUTPATIENT
Start: 2018-01-01

## 2018-01-01 RX ORDER — ALBUTEROL SULFATE 2.5 MG/3ML
2.5 SOLUTION RESPIRATORY (INHALATION)
COMMUNITY

## 2018-01-01 RX ORDER — VALERIAN ROOT 250 MG
1000 CAPSULE ORAL DAILY
COMMUNITY
End: 2018-01-01 | Stop reason: ALTCHOICE

## 2018-01-01 RX ORDER — ALBUTEROL SULFATE 90 UG/1
2 AEROSOL, METERED RESPIRATORY (INHALATION) EVERY 6 HOURS PRN
Qty: 1 INHALER | Refills: 3 | Status: SHIPPED | OUTPATIENT
Start: 2018-01-01 | End: 2018-01-01 | Stop reason: SDUPTHER

## 2018-01-01 RX ORDER — TORSEMIDE 20 MG/1
1 TABLET ORAL 3 TIMES DAILY
COMMUNITY
Start: 2018-01-01 | End: 2018-01-01 | Stop reason: ALTCHOICE

## 2018-01-01 RX ORDER — TRAZODONE HYDROCHLORIDE 50 MG/1
TABLET ORAL
Qty: 60 TABLET | Refills: 3 | Status: SHIPPED | OUTPATIENT
Start: 2018-01-01 | End: 2018-01-01 | Stop reason: SDUPTHER

## 2018-01-01 RX ORDER — GUAIFENESIN 600 MG/1
1200 TABLET, EXTENDED RELEASE ORAL 2 TIMES DAILY PRN
Qty: 120 TABLET | Refills: 3 | Status: SHIPPED | OUTPATIENT
Start: 2018-01-01

## 2018-01-01 RX ORDER — PROMETHAZINE HYDROCHLORIDE 25 MG/1
25 TABLET ORAL EVERY 6 HOURS PRN
Qty: 30 TABLET | Refills: 3 | Status: SHIPPED | OUTPATIENT
Start: 2018-01-01 | End: 2018-01-01

## 2018-01-01 RX ORDER — RALOXIFENE HYDROCHLORIDE 60 MG/1
60 TABLET, FILM COATED ORAL DAILY
Qty: 30 TABLET | Refills: 3 | Status: SHIPPED | OUTPATIENT
Start: 2018-01-01

## 2018-01-01 RX ORDER — CARVEDILOL 25 MG/1
25 TABLET ORAL DAILY
COMMUNITY

## 2018-01-01 RX ORDER — FUROSEMIDE 20 MG/1
20 TABLET ORAL 2 TIMES DAILY
Qty: 30 TABLET | Refills: 3 | Status: SHIPPED | OUTPATIENT
Start: 2018-01-01

## 2018-01-01 ASSESSMENT — ENCOUNTER SYMPTOMS
SHORTNESS OF BREATH: 1
BACK PAIN: 1
CHEST TIGHTNESS: 1
NAUSEA: 1
VOMITING: 1
CHOKING: 1
COUGH: 1

## 2018-01-02 LAB
CYTO UR: NORMAL
DX PRELIMINARY: NORMAL
LAB AP CASE REPORT: NORMAL
LAB AP CLINICAL INFORMATION: NORMAL
LAB AP DIAGNOSIS COMMENT: NORMAL
Lab: NORMAL
PATH REPORT.FINAL DX SPEC: NORMAL
PATH REPORT.GROSS SPEC: NORMAL

## 2018-02-18 PROBLEM — C34.91 MALIGNANT NEOPLASM OF RIGHT LUNG (HCC): Status: ACTIVE | Noted: 2018-01-01

## 2018-03-27 ENCOUNTER — TELEPHONE (OUTPATIENT)
Dept: CARDIAC SURGERY | Facility: CLINIC | Age: 68
End: 2018-03-27

## 2018-03-27 NOTE — TELEPHONE ENCOUNTER
The pt had an appt back on 01/10/2018 with Dr. Hanley and CX that appt due to being sick. I spoke with her daughter, Regla, today 03/27/208 regarding this appt. She stated her mom is with hospice care and it is very hard to get her to and from Dr. Alonzo's. Regla also stated that she would like to get a copy of her mothers pathology report because they have not been back to our office since she had the biopsy. I informed her that I could mail a release form to her mother address, they could help her fill out the form but her mother would be the one who has to sign it. She said that was fine. I have put the release for in the mail today, 03/27/2018.

## 2023-09-06 NOTE — PROGRESS NOTES
Pt called complaining of dyspnea and wheezing that improved with PRN albuterol use.  No hx of COPD noted today.  Will trial Albuterol inhaler, but pt needs to f/u with PCP 2-3 weeks for evaluation.  May need pulmonary referral.     1 = Total assistance

## 2025-05-01 NOTE — PROGRESS NOTES
put her back on another fluid pill. She does not have a nephrology follow-up until November. She would like to be referred to a different provider and get an earlier appointment if possible. Due to her shortness of air and swelling she went to [facility] emergency department last week and was sent home. No labs completed or medications given. She was told to follow-up with PCP and cardiologist. She had scheduled echo completed yesterday and cardiology follow-up on Monday. Due to her recent experiences she would like a referral to a different cardiologist that is closer such as Dr. Carmen Brasher in [facility]. 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 She has cataracts in the right eye. Addendum  Patient was sent for chest x-ray after this visit. X-ray showed right-sided effusion with cardiac enlargement and 6 mm mass in the right upper lobe. CT scan was indicated. CT was immediately ordered and patient was contacted. Patient had return to the emergency department due to her swelling and increased shortness of air. She had been admitted to the hospital and CT scan was completed they air showing this mass. CT scan ordered here was canceled. Patient will follow-up upon discharge. Review of Systems   Constitutional: Positive for fatigue. Respiratory: Positive for cough, chest tightness and shortness of breath. Cardiovascular: Positive for leg swelling. Musculoskeletal: Positive for arthralgias, back pain, gait problem and myalgias. Psychiatric/Behavioral: The patient is nervous/anxious. All other systems reviewed and are negative. OBJECTIVE:  BP (!) 162/80 (Site: Left Arm, Position: Sitting, Cuff Size: Large Adult)   Pulse 86   Resp 18   Ht 5' 4\" (1.626 m)   Wt 191 lb (86.6 kg)   SpO2 97%   Breastfeeding? No   BMI 32.79 kg/m²    Physical Exam   Constitutional: She is oriented to person, place, and time.  She appears well-developed and well-nourished. HENT:   Head: Normocephalic and atraumatic. Right Ear: External ear normal.   Left Ear: External ear normal.   Nose: Nose normal.   Mouth/Throat: Oropharynx is clear and moist.   Eyes: Conjunctivae are normal. Pupils are equal, round, and reactive to light. Poor vision and eyes bilaterally. Left eye was affected by histoplasmosis. Right eye is affected by cataract. Neck: Normal range of motion. Neck supple. Cardiovascular: Normal rate, regular rhythm, normal heart sounds and intact distal pulses. Generalized overall swelling of face, hands, legs, and feet bilaterally. Pulmonary/Chest: Effort normal. She has decreased breath sounds in the right upper field, the right middle field and the right lower field. Minimal breath sounds on right side. Abdominal: Soft. Bowel sounds are normal.   Musculoskeletal: Normal range of motion. Neurological: She is alert and oriented to person, place, and time. Skin: Skin is warm and dry. Psychiatric: Her speech is normal and behavior is normal. Judgment and thought content normal. Her mood appears anxious. Cognition and memory are impaired. Nursing note and vitals reviewed.     Results in Past 30 Days  Result Component Current Result Ref Range Previous Result Ref Range   Alb 3.9 (10/18/2017) 3.4 - 4.8 g/dL Not in Time Range    Albumin/Globulin Ratio 1.4 (10/18/2017) 0.8 - 2.0 Not in Time Range    Alkaline Phosphatase 103 (H) (10/18/2017) 25 - 100 U/L Not in Time Range    ALT 6 (10/18/2017) 4 - 36 U/L Not in Time Range    AST 13 (10/18/2017) 8 - 33 U/L Not in Time Range    BUN 29 (H) (10/18/2017) 6 - 20 mg/dL 29 (H) (10/9/2017) 6 - 20 mg/dL   Calcium 8.8 (10/18/2017) 8.5 - 10.5 mg/dL 8.7 (10/9/2017) 8.5 - 10.5 mg/dL   Chloride 108 (H) (10/18/2017) 98 - 107 mmol/L 107 (10/9/2017) 98 - 107 mmol/L   CO2 18 (L) (10/18/2017) 20 - 30 mmol/L 18 (L) (10/9/2017) 20 - 30 mmol/L   CREATININE 2.2 (H) (10/18/2017) 0.4 - 1.2 mg/dL 2.2 (H) (10/9/2017) 0.4 - Kidney disease, chronic, stage IV (GFR 15-29 ml/min) ([facility])  Comprehensive Metabolic Panel   7. Hyperlipidemia, unspecified hyperlipidemia type  simvastatin (ZOCOR) 40 MG tablet    Lipid Panel   8. Alopecia  TSH with Reflex   9. Skin rash  triamcinolone (KENALOG) 0.1 % cream   10. Stable angina ([facility])  nitroGLYCERIN (NITROSTAT) 0.4 MG SL tablet   11. Fatigue, unspecified type  Vitamin B12   12. Thyroid disorder screening  T4, Free    TSH with Reflex   13. Histoplasmosis     14. Loss of vision     15. Cataract of right eye, unspecified cataract type     16. Family history of cardiovascular disease          Orders Placed This Encounter   Procedures    XR CHEST STANDARD (2 VW)     Standing Status:   Future     Number of Occurrences:   1     Standing Expiration Date:   10/18/2018     Order Specific Question:   Reason for exam:     Answer:   History of pulmonary edema and congestive heart failure. Has increase in swelling, shortness of air, and foamy sputum.     CBC Auto Differential     Standing Status:   Future     Number of Occurrences:   1     Standing Expiration Date:   10/19/2018    Comprehensive Metabolic Panel     Standing Status:   Future     Number of Occurrences:   1     Standing Expiration Date:   10/19/2018    T4, Free     Standing Status:   Future     Number of Occurrences:   1     Standing Expiration Date:   10/19/2018    Vitamin B12     Standing Status:   Future     Number of Occurrences:   1     Standing Expiration Date:   10/19/2018    Lipid Panel     Standing Status:   Future     Number of Occurrences:   1     Standing Expiration Date:   10/18/2018     Order Specific Question:   Is Patient Fasting?/# of Hours     Answer:   8 hours    TSH with Reflex     Standing Status:   Future     Number of Occurrences:   1     Standing Expiration Date:   10/18/2018        Outpatient Encounter Prescriptions as of 10/18/2017   Medication Sig Dispense Refill    VENTOLIN  (90 Base) MCG/ACT inhaler Inhale 2 puffs into the lungs 2 times daily as needed      simvastatin (ZOCOR) 40 MG tablet Take 40 mg by mouth daily      lisinopril (PRINIVIL;ZESTRIL) 20 MG tablet       nitroGLYCERIN (NITROSTAT) 0.4 MG SL tablet Place 0.4 mg under the tongue every 5 minutes as needed for Chest pain up to max of 3 total doses. If no relief after 1 dose, call 911.  labetalol (NORMODYNE) 300 MG tablet Take 1 tablet by mouth daily 30 tablet 0    NIFEdipine (PROCARDIA XL) 90 MG extended release tablet Take 1 tablet by mouth daily 30 tablet 0    doxazosin (CARDURA) 2 MG tablet Take 2 tablets by mouth daily 30 tablet 0    triamcinolone (KENALOG) 0.1 % cream Apply topically 3 times daily. 80 g 3    chlorthalidone (HYGROTON) 25 MG tablet Take 25 mg by mouth daily      [DISCONTINUED] doxazosin (CARDURA) 2 MG tablet Take 2 mg by mouth daily      [DISCONTINUED] labetalol (NORMODYNE) 300 MG tablet Take 300 mg by mouth 3 times daily      [DISCONTINUED] NIFEdipine (PROCARDIA XL) 90 MG extended release tablet Take 90 mg by mouth 3 times daily       No facility-administered encounter medications on file as of 10/18/2017. Return in about 2 weeks (around 11/1/2017), or if symptoms worsen or fail to improve. PATIENT COUNSELING    Counseling was provided today regarding the following topics: Healthy eating habits, Regular exercise, substance abuse and healthy sleep habits. Discussed use, benefit, and side effects of prescribed medications. Barriers to medication compliance addressed. All patient questions answered. Pt voiced understanding. Unsure (2)

## (undated) DEVICE — AIRWY 90MM NO9

## (undated) DEVICE — ST NDL BRONCH ASP VIZISHOT 2 FLX 19GA

## (undated) DEVICE — MEDI-VAC NON-CONDUCTIVE SUCTION TUBING: Brand: CARDINAL HEALTH

## (undated) DEVICE — CANNULA,OXY,ADULT,SUPERSOFT,W/7'TUB,UC: Brand: MEDLINE

## (undated) DEVICE — SYR SLP TP 10ML DISP

## (undated) DEVICE — SYR SLPTP 20CC

## (undated) DEVICE — FLTR HME STR UNIV W/SMPL PORT

## (undated) DEVICE — MEDI-VAC YANKAUER SUCTION HANDLE W/BULBOUS TIP: Brand: CARDINAL HEALTH

## (undated) DEVICE — TRAP,MUCUS SPECIMEN,40CC: Brand: MEDLINE

## (undated) DEVICE — FRCP BX RADJAW4 PULM NDL STD 1.8MM 100CM